# Patient Record
Sex: FEMALE | Race: WHITE | Employment: UNEMPLOYED | URBAN - METROPOLITAN AREA
[De-identification: names, ages, dates, MRNs, and addresses within clinical notes are randomized per-mention and may not be internally consistent; named-entity substitution may affect disease eponyms.]

---

## 2019-04-16 ENCOUNTER — HOSPITAL ENCOUNTER (INPATIENT)
Age: 29
LOS: 2 days | Discharge: HOME | DRG: 424 | End: 2019-04-18
Attending: EMERGENCY MEDICINE | Admitting: INTERNAL MEDICINE
Payer: COMMERCIAL

## 2019-04-16 ENCOUNTER — APPOINTMENT (OUTPATIENT)
Dept: GENERAL RADIOLOGY | Age: 29
DRG: 424 | End: 2019-04-16
Attending: INTERNAL MEDICINE
Payer: COMMERCIAL

## 2019-04-16 ENCOUNTER — APPOINTMENT (OUTPATIENT)
Dept: ULTRASOUND IMAGING | Age: 29
DRG: 424 | End: 2019-04-16
Attending: INTERNAL MEDICINE
Payer: COMMERCIAL

## 2019-04-16 ENCOUNTER — APPOINTMENT (OUTPATIENT)
Dept: CT IMAGING | Age: 29
DRG: 424 | End: 2019-04-16
Attending: EMERGENCY MEDICINE
Payer: COMMERCIAL

## 2019-04-16 DIAGNOSIS — F12.90 MARIJUANA USE: ICD-10-CM

## 2019-04-16 DIAGNOSIS — R10.9 FLANK PAIN: ICD-10-CM

## 2019-04-16 DIAGNOSIS — G43.A0 CYCLIC VOMITING SYNDROME, INTRACTABILITY OF VOMITING NOT SPECIFIED, PRESENCE OF NAUSEA NOT SPECIFIED: ICD-10-CM

## 2019-04-16 DIAGNOSIS — N20.0 KIDNEY STONE: Primary | ICD-10-CM

## 2019-04-16 PROBLEM — I95.9 HYPOTENSION: Status: ACTIVE | Noted: 2019-04-16

## 2019-04-16 PROBLEM — R11.10 INTRACTABLE VOMITING: Status: ACTIVE | Noted: 2019-04-16

## 2019-04-16 LAB
ALBUMIN SERPL-MCNC: 4.1 G/DL (ref 3.5–5)
ALBUMIN/GLOB SERPL: 1.1 {RATIO} (ref 1.1–2.2)
ALP SERPL-CCNC: 97 U/L (ref 45–117)
ALT SERPL-CCNC: 65 U/L (ref 12–78)
ANION GAP SERPL CALC-SCNC: 10 MMOL/L (ref 5–15)
APPEARANCE UR: ABNORMAL
AST SERPL-CCNC: 50 U/L (ref 15–37)
BACTERIA URNS QL MICRO: ABNORMAL /HPF
BASOPHILS # BLD: 0.1 K/UL (ref 0–0.1)
BASOPHILS NFR BLD: 1 % (ref 0–1)
BILIRUB SERPL-MCNC: 0.5 MG/DL (ref 0.2–1)
BILIRUB UR QL CFM: NEGATIVE
BUN SERPL-MCNC: 18 MG/DL (ref 6–20)
BUN/CREAT SERPL: 18 (ref 12–20)
CALCIUM SERPL-MCNC: 9.4 MG/DL (ref 8.5–10.1)
CHLORIDE SERPL-SCNC: 104 MMOL/L (ref 97–108)
CO2 SERPL-SCNC: 25 MMOL/L (ref 21–32)
COLOR UR: ABNORMAL
CREAT SERPL-MCNC: 1.02 MG/DL (ref 0.55–1.02)
DIFFERENTIAL METHOD BLD: ABNORMAL
EOSINOPHIL # BLD: 0.2 K/UL (ref 0–0.4)
EOSINOPHIL NFR BLD: 3 % (ref 0–7)
EPITH CASTS URNS QL MICRO: ABNORMAL /LPF
ERYTHROCYTE [DISTWIDTH] IN BLOOD BY AUTOMATED COUNT: 12.5 % (ref 11.5–14.5)
GLOBULIN SER CALC-MCNC: 3.8 G/DL (ref 2–4)
GLUCOSE SERPL-MCNC: 113 MG/DL (ref 65–100)
GLUCOSE UR STRIP.AUTO-MCNC: NEGATIVE MG/DL
HCG SERPL QL: NEGATIVE
HCT VFR BLD AUTO: 41.9 % (ref 35–47)
HGB BLD-MCNC: 14.6 G/DL (ref 11.5–16)
HGB UR QL STRIP: ABNORMAL
IMM GRANULOCYTES # BLD AUTO: 0 K/UL (ref 0–0.04)
IMM GRANULOCYTES NFR BLD AUTO: 0 % (ref 0–0.5)
KETONES UR QL STRIP.AUTO: 15 MG/DL
LACTATE SERPL-SCNC: 1.4 MMOL/L (ref 0.4–2)
LEUKOCYTE ESTERASE UR QL STRIP.AUTO: ABNORMAL
LYMPHOCYTES # BLD: 1.9 K/UL (ref 0.8–3.5)
LYMPHOCYTES NFR BLD: 42 % (ref 12–49)
MCH RBC QN AUTO: 29 PG (ref 26–34)
MCHC RBC AUTO-ENTMCNC: 34.8 G/DL (ref 30–36.5)
MCV RBC AUTO: 83.3 FL (ref 80–99)
MONOCYTES # BLD: 0.7 K/UL (ref 0–1)
MONOCYTES NFR BLD: 15 % (ref 5–13)
NEUTS SEG # BLD: 1.9 K/UL (ref 1.8–8)
NEUTS SEG NFR BLD: 39 % (ref 32–75)
NITRITE UR QL STRIP.AUTO: NEGATIVE
NRBC # BLD: 0 K/UL (ref 0–0.01)
NRBC BLD-RTO: 0 PER 100 WBC
PH UR STRIP: 5.5 [PH] (ref 5–8)
PLATELET # BLD AUTO: 177 K/UL (ref 150–400)
PMV BLD AUTO: 10.5 FL (ref 8.9–12.9)
POTASSIUM SERPL-SCNC: 3.4 MMOL/L (ref 3.5–5.1)
PROT SERPL-MCNC: 7.9 G/DL (ref 6.4–8.2)
PROT UR STRIP-MCNC: ABNORMAL MG/DL
RBC # BLD AUTO: 5.03 M/UL (ref 3.8–5.2)
RBC #/AREA URNS HPF: ABNORMAL /HPF (ref 0–5)
SODIUM SERPL-SCNC: 139 MMOL/L (ref 136–145)
SP GR UR REFRACTOMETRY: 1.03 (ref 1–1.03)
T4 FREE SERPL-MCNC: 1.6 NG/DL (ref 0.8–1.5)
TSH SERPL DL<=0.05 MIU/L-ACNC: 36.6 UIU/ML (ref 0.36–3.74)
UA: UC IF INDICATED,UAUC: ABNORMAL
UROBILINOGEN UR QL STRIP.AUTO: 1 EU/DL (ref 0.2–1)
WBC # BLD AUTO: 4.7 K/UL (ref 3.6–11)
WBC URNS QL MICRO: ABNORMAL /HPF (ref 0–4)

## 2019-04-16 PROCEDURE — 85025 COMPLETE CBC W/AUTO DIFF WBC: CPT

## 2019-04-16 PROCEDURE — 93005 ELECTROCARDIOGRAM TRACING: CPT

## 2019-04-16 PROCEDURE — 83605 ASSAY OF LACTIC ACID: CPT

## 2019-04-16 PROCEDURE — 36415 COLL VENOUS BLD VENIPUNCTURE: CPT

## 2019-04-16 PROCEDURE — 96361 HYDRATE IV INFUSION ADD-ON: CPT

## 2019-04-16 PROCEDURE — 74011250636 HC RX REV CODE- 250/636: Performed by: INTERNAL MEDICINE

## 2019-04-16 PROCEDURE — 84481 FREE ASSAY (FT-3): CPT

## 2019-04-16 PROCEDURE — 80053 COMPREHEN METABOLIC PANEL: CPT

## 2019-04-16 PROCEDURE — 84443 ASSAY THYROID STIM HORMONE: CPT

## 2019-04-16 PROCEDURE — 99218 HC RM OBSERVATION: CPT

## 2019-04-16 PROCEDURE — 84703 CHORIONIC GONADOTROPIN ASSAY: CPT

## 2019-04-16 PROCEDURE — 96375 TX/PRO/DX INJ NEW DRUG ADDON: CPT

## 2019-04-16 PROCEDURE — 87086 URINE CULTURE/COLONY COUNT: CPT

## 2019-04-16 PROCEDURE — 74011250637 HC RX REV CODE- 250/637: Performed by: EMERGENCY MEDICINE

## 2019-04-16 PROCEDURE — 71045 X-RAY EXAM CHEST 1 VIEW: CPT

## 2019-04-16 PROCEDURE — 65660000000 HC RM CCU STEPDOWN

## 2019-04-16 PROCEDURE — 96374 THER/PROPH/DIAG INJ IV PUSH: CPT

## 2019-04-16 PROCEDURE — 86376 MICROSOMAL ANTIBODY EACH: CPT

## 2019-04-16 PROCEDURE — 84439 ASSAY OF FREE THYROXINE: CPT

## 2019-04-16 PROCEDURE — 99284 EMERGENCY DEPT VISIT MOD MDM: CPT

## 2019-04-16 PROCEDURE — 74176 CT ABD & PELVIS W/O CONTRAST: CPT

## 2019-04-16 PROCEDURE — 74011000250 HC RX REV CODE- 250: Performed by: INTERNAL MEDICINE

## 2019-04-16 PROCEDURE — 74011250636 HC RX REV CODE- 250/636: Performed by: EMERGENCY MEDICINE

## 2019-04-16 PROCEDURE — 81001 URINALYSIS AUTO W/SCOPE: CPT

## 2019-04-16 PROCEDURE — 76705 ECHO EXAM OF ABDOMEN: CPT

## 2019-04-16 RX ORDER — LEVOFLOXACIN 5 MG/ML
750 INJECTION, SOLUTION INTRAVENOUS EVERY 24 HOURS
Status: DISCONTINUED | OUTPATIENT
Start: 2019-04-16 | End: 2019-04-18 | Stop reason: HOSPADM

## 2019-04-16 RX ORDER — MORPHINE SULFATE 2 MG/ML
4 INJECTION, SOLUTION INTRAMUSCULAR; INTRAVENOUS
Status: COMPLETED | OUTPATIENT
Start: 2019-04-16 | End: 2019-04-16

## 2019-04-16 RX ORDER — TAMSULOSIN HYDROCHLORIDE 0.4 MG/1
0.4 CAPSULE ORAL
Status: COMPLETED | OUTPATIENT
Start: 2019-04-16 | End: 2019-04-16

## 2019-04-16 RX ORDER — SODIUM CHLORIDE 0.9 % (FLUSH) 0.9 %
5-40 SYRINGE (ML) INJECTION EVERY 8 HOURS
Status: DISCONTINUED | OUTPATIENT
Start: 2019-04-16 | End: 2019-04-18 | Stop reason: HOSPADM

## 2019-04-16 RX ORDER — KETOROLAC TROMETHAMINE 30 MG/ML
30 INJECTION, SOLUTION INTRAMUSCULAR; INTRAVENOUS
Status: DISCONTINUED | OUTPATIENT
Start: 2019-04-16 | End: 2019-04-18 | Stop reason: HOSPADM

## 2019-04-16 RX ORDER — CEPHALEXIN 250 MG/1
500 CAPSULE ORAL
Status: COMPLETED | OUTPATIENT
Start: 2019-04-16 | End: 2019-04-16

## 2019-04-16 RX ORDER — ONDANSETRON 4 MG/1
4 TABLET, ORALLY DISINTEGRATING ORAL
Qty: 10 TAB | Refills: 0 | Status: SHIPPED | OUTPATIENT
Start: 2019-04-16 | End: 2019-04-18

## 2019-04-16 RX ORDER — ONDANSETRON 2 MG/ML
4 INJECTION INTRAMUSCULAR; INTRAVENOUS
Status: DISCONTINUED | OUTPATIENT
Start: 2019-04-16 | End: 2019-04-18 | Stop reason: HOSPADM

## 2019-04-16 RX ORDER — ONDANSETRON 2 MG/ML
4 INJECTION INTRAMUSCULAR; INTRAVENOUS
Status: COMPLETED | OUTPATIENT
Start: 2019-04-16 | End: 2019-04-16

## 2019-04-16 RX ORDER — CETIRIZINE HCL 10 MG
10 TABLET ORAL
COMMUNITY

## 2019-04-16 RX ORDER — HYDROMORPHONE HYDROCHLORIDE 1 MG/ML
1 INJECTION, SOLUTION INTRAMUSCULAR; INTRAVENOUS; SUBCUTANEOUS
Status: DISCONTINUED | OUTPATIENT
Start: 2019-04-16 | End: 2019-04-16

## 2019-04-16 RX ORDER — ACETAMINOPHEN 325 MG/1
650 TABLET ORAL
Status: DISCONTINUED | OUTPATIENT
Start: 2019-04-16 | End: 2019-04-18 | Stop reason: HOSPADM

## 2019-04-16 RX ORDER — PROCHLORPERAZINE EDISYLATE 5 MG/ML
10 INJECTION INTRAMUSCULAR; INTRAVENOUS
Status: COMPLETED | OUTPATIENT
Start: 2019-04-16 | End: 2019-04-16

## 2019-04-16 RX ORDER — KETOROLAC TROMETHAMINE 30 MG/ML
15 INJECTION, SOLUTION INTRAMUSCULAR; INTRAVENOUS
Status: COMPLETED | OUTPATIENT
Start: 2019-04-16 | End: 2019-04-16

## 2019-04-16 RX ORDER — LEVOTHYROXINE SODIUM 75 UG/1
75 TABLET ORAL
Status: DISCONTINUED | OUTPATIENT
Start: 2019-04-17 | End: 2019-04-16

## 2019-04-16 RX ORDER — PROCHLORPERAZINE EDISYLATE 5 MG/ML
10 INJECTION INTRAMUSCULAR; INTRAVENOUS
Status: DISCONTINUED | OUTPATIENT
Start: 2019-04-16 | End: 2019-04-16

## 2019-04-16 RX ORDER — ONDANSETRON 4 MG/1
4 TABLET, ORALLY DISINTEGRATING ORAL
Status: ON HOLD | COMMUNITY
End: 2019-04-18

## 2019-04-16 RX ORDER — SODIUM CHLORIDE 0.9 % (FLUSH) 0.9 %
5-40 SYRINGE (ML) INJECTION AS NEEDED
Status: DISCONTINUED | OUTPATIENT
Start: 2019-04-16 | End: 2019-04-18 | Stop reason: HOSPADM

## 2019-04-16 RX ORDER — SODIUM CHLORIDE, SODIUM LACTATE, POTASSIUM CHLORIDE, CALCIUM CHLORIDE 600; 310; 30; 20 MG/100ML; MG/100ML; MG/100ML; MG/100ML
125 INJECTION, SOLUTION INTRAVENOUS CONTINUOUS
Status: DISCONTINUED | OUTPATIENT
Start: 2019-04-16 | End: 2019-04-18 | Stop reason: HOSPADM

## 2019-04-16 RX ADMIN — SODIUM CHLORIDE 1000 ML: 900 INJECTION, SOLUTION INTRAVENOUS at 07:37

## 2019-04-16 RX ADMIN — ONDANSETRON 4 MG: 2 INJECTION INTRAMUSCULAR; INTRAVENOUS at 07:37

## 2019-04-16 RX ADMIN — Medication 10 ML: at 20:09

## 2019-04-16 RX ADMIN — MORPHINE SULFATE 4 MG: 2 INJECTION, SOLUTION INTRAMUSCULAR; INTRAVENOUS at 07:37

## 2019-04-16 RX ADMIN — TAMSULOSIN HYDROCHLORIDE 0.4 MG: 0.4 CAPSULE ORAL at 09:57

## 2019-04-16 RX ADMIN — LEVOFLOXACIN 750 MG: 5 INJECTION, SOLUTION INTRAVENOUS at 22:56

## 2019-04-16 RX ADMIN — SODIUM CHLORIDE 1000 ML: 900 INJECTION, SOLUTION INTRAVENOUS at 12:09

## 2019-04-16 RX ADMIN — CEPHALEXIN 500 MG: 250 CAPSULE ORAL at 11:41

## 2019-04-16 RX ADMIN — SODIUM CHLORIDE 1000 ML: 900 INJECTION, SOLUTION INTRAVENOUS at 16:25

## 2019-04-16 RX ADMIN — PROCHLORPERAZINE EDISYLATE 10 MG: 5 INJECTION INTRAMUSCULAR; INTRAVENOUS at 12:13

## 2019-04-16 RX ADMIN — LEVOTHYROXINE SODIUM ANHYDROUS 200 MCG: 100 INJECTION, POWDER, LYOPHILIZED, FOR SOLUTION INTRAVENOUS at 20:08

## 2019-04-16 RX ADMIN — KETOROLAC TROMETHAMINE 15 MG: 30 INJECTION, SOLUTION INTRAMUSCULAR; INTRAVENOUS at 07:37

## 2019-04-16 RX ADMIN — SODIUM CHLORIDE, SODIUM LACTATE, POTASSIUM CHLORIDE, AND CALCIUM CHLORIDE 125 ML/HR: 600; 310; 30; 20 INJECTION, SOLUTION INTRAVENOUS at 20:13

## 2019-04-16 NOTE — ED TRIAGE NOTES
Pt. Presents to ED with L flank pain that woke her up this AM.  Patient reports having kidney stones in the past. Pt. Alert and oriented x4. PT. Placed in position of comfort with call bell in reach.

## 2019-04-16 NOTE — ED NOTES
Pt. Back from CT at this time. Pt. Placed back on monitor with call bell in reach. Pt. With no complaints and does not want pain medication at this time.

## 2019-04-16 NOTE — ED NOTES
Writer called into room at this time. Patient vomiting and stating the pain is back. Spoke with Dr. Danilo Clemente who is to place orders and come see patient.

## 2019-04-16 NOTE — H&P
Hospitalist Admission NoteNAME: [facility] :  1990 MRN:  [id number] Date/Time:  2019 4:55 PM 
 
Patient PCP: None 
______________________________________________________________________ Given the patient's current clinical presentation, I have a high level of concern for decompensation if discharged from the emergency department. Complex decision making was performed, which includes reviewing the patient's available past medical records, laboratory results, and x-ray films. My assessment of this patient's clinical condition and my plan of care is as follows. Assessment / Plan: 
Addendum 2: 
Free T4 mildly elevated at 1.6 (1.5 is upper limit of normal), patient still hypotensive, will change to Inpatient. I have also placed Endocrine consult and case discussed with on-call Endocrinologist shireen (Dr. Miguel Tyson). Will send T3 (note patient already received IV Levothyroxine) and Thyroid antibody panel. Will check Lactic acid as well. TSH producing Adenoma in differential diagnosis. Will start Levaquin as well (UA not convincing for UTI, may epi's and no pyuria). ____________________ TSH 36 - Patient with Hypothyroidism, with patient's low HR and BP will given 200 mcg IV Levothyroxine once as a loading dose and start 75 mcg daily tomorrow am 
____________________ Left kidney stone with vomiting and dehydration 
-admit to inpatient, telemetry 
-IVF's 
-with below issues will not start flomax at this time 
-IV toradol prn, avoid opiates because of hypotension 
-IV antiemetics (zofran) prn; avoid compazine (see below) Hypotension and Bradycardia: ?Iatrogenic (did IV morphine initially drop BP and by the time patient recovered the IV Compazine dropped it again, Flomax can also cause hypotension), Takotsubo's cardiomyopathy (with passing of friend), Endocrine issue (hitory of untreated hypothyroidism) 07:37 4 mg IV morphine/Zofran 
07:29 /97 
09:57 Flomax 10:00 BP 82/46 
12:08 /64 
12:13 BP 10 mg IV Compazine 13:00 82/47 
-Continue IVF's 
-bedrest until SBP greater than 90 mmHg 
-check TFT's 
-check am cortisol 
-TTE 
-EKG with Sinus Bradycardia Mild Hypokalemia: 
-monitor (patient on LR) Incidental findings on CT A/P: \". ..2. Concern for a hypodense lesion in the dome of the liver. Prominent gastrohepatic and retroperitoneal lymph nodes of uncertain etiology and clinical significance. Correlation with nonemergent contrast-enhanced CT is recommended. \" 
-LFT's wnl, will order abdominal ultrasound to further evaluate Also discussed with patient the Incidental Lymphadenopathy noted on CT - ?related to Hypothyroidism as well as patient's 2.6 cm right ovarian cyst  
 
Code Status: Full Surrogate Decision Maker:  DVT Prophylaxis:  
GI Prophylaxis: not indicated Baseline: Normal  
  
Subjective: CHIEF COMPLAINT: left flank pain HISTORY OF PRESENT ILLNESS:    
Trena Felty is a 25 y.o.  female who presented with left flank pain this am that awoke her this am. Patient presented here with severe pain and felt that it was a kidney stone. Patient's CT A/P showed: \"1. Punctate stone left UVJ causing mild left hydronephrosis. Bilateral nonobstructing renal stones. 2. Concern for a hypodense lesion in the dome of the liver. Prominent gastrohepatic and retroperitoneal lymph nodes of uncertain etiology and clinical significance. Correlation with nonemergent contrast-enhanced CT is recommended. 3. Periportal and pericholecystic edema may be related to fluid administration. \" Patient denies any fevers or chills. She does get lightheaded with sitting. Patient also hypotensive at time of my evaluation. Patient is unsure about her baseline BP. Patient reports having the flu last month and has not recoererd since. Patient reports that towards the end of her flu illness she had 3 days of severe chest heaviness.  Patient reports continue REYNOSO even with house work since this time. Patient denies and LE swelling. We were asked to admit for work up and evaluation of the above problems. PMH: 
History of flu in March 2019 History of kidney stone Premature at birth History of Heart Murmur ? Hypothyroidism, states that she is not on treatment because she told that treatment was optional to start but after she started she would have to stay on 
 
Social History Tobacco Use  Smoking status: Occasional tobacco and marijuana Substance Use Topics  Alcohol use: Social  
  
Family History: denies family history of CAD, CHF, Autoimmune Disease; Father with Alcoholism. Allergies: Chocolate makes the inside of patient's mouth peel Prior to Admission medications Medication Sig Start Date End Date Taking? Authorizing Provider  
ondansetron (ZOFRAN ODT) 4 mg disintegrating tablet Take 1 Tab by mouth every eight (8) hours as needed for Nausea. 4/16/19  Yes Santhosh Bird MD  
cetirizine (ZYRTEC) 10 mg tablet Take 10 mg by mouth daily as needed for Allergies. Yes Provider, Historical  
ondansetron (ZOFRAN ODT) 4 mg disintegrating tablet Take 4 mg by mouth every eight (8) hours as needed for Nausea. Provider, Historical  
 
 
REVIEW OF SYSTEMS:    
I am not able to complete the review of systems because: The patient is intubated and sedated The patient has altered mental status due to his acute medical problems The patient has baseline aphasia from prior stroke(s) The patient has baseline dementia and is not reliable historian The patient is in acute medical distress and unable to provide information Total of 12 systems reviewed as follows:   
   POSITIVE= underlined text  Negative = text not underlined General:  fever, chill, sweats, generalized weakness, weight loss/gain,  
   loss of appetite Eyes:    blurred vision, eye pain, loss of vision, double vision ENT:    rhinorrhea, pharyngitis Respiratory:   cough, sputum production (mild, coughed hard this am and had blood streaked sputum x1), SOB, REYNOSO since having the flu, wheezing, pleuritic pain  
Cardiology:   chest pain, palpitations, orthopnea, PND, edema, syncope Gastrointestinal:  abdominal pain , N/V, diarrhea, dysphagia, constipation, bleeding Genitourinary:  frequency, urgency, dysuria, hematuria, incontinence Muskuloskeletal :  arthralgia, myalgia, back pain Hematology:  easy bruising, nose or gum bleeding, lymphadenopathy Dermatological: rash, ulceration, pruritis, color change / jaundice Endocrine:   hot flashes or polydipsia Neurological:  headache, dizziness, confusion, focal weakness, paresthesia, Speech difficulties, memory loss, gait difficulty Psychological: Feelings of anxiety, depression, agitation; grief after friend passed away this last weekend Objective: VITALS:   
Visit Vitals BP (!) 83/48 Pulse (!) 46 Resp 12 Ht 5' 2\" (1.575 m) Wt 47.6 kg (105 lb) SpO2 100% BMI 19.20 kg/m² PHYSICAL EXAM: 
 
General:    Alert, cooperative, no distress, appears stated age. HEENT: Atraumatic, anicteric sclerae, pink conjunctivae No oral ulcers, mucosa dry, o/p clear Neck:  Supple, symmetrical,  Thyroid not enlarged Lungs:   Clear to auscultation bilaterally. No Wheezing or Rhonchi. No rales. Chest wall:  No tenderness  No Accessory muscle use. Heart:   Regular  rhythm,  Bradycardic,  No edema Abdomen:   Soft, non-tender. Not distended. Bowel sounds normal 
Extremities: No cyanosis. No clubbing,   
  Skin turgor normal, Capillary refill normal, Radial dial pulse 2+ Skin:     Not pale. Not Jaundiced  No rashes Psych:  Good insight. Not depressed. Not anxious or agitated. Neurologic: EOMs intact. No facial asymmetry. No aphasia or slurred speech. Symmetrical strength, No focal neurologic deficits. Alert and oriented X 4. _______________________________________________________________________ Care Plan discussed with: 
  Comments Patient x Family  x   
RN x Care Manager Consultant:     
_______________________________________________________________________ Expected  Disposition:  
Home with Family HH/PT/OT/RN   
SNF/LTC   
ROSANNA   
________________________________________________________________________ TOTAL TIME:  65 Minutes Critical Care Provided     Minutes non procedure based Comments  
 x Reviewed previous records  
>50% of visit spent in counseling and coordination of care x Discussion with patient and/or family and questions answered 
  
 
________________________________________________________________________ Signed: Kacie Mcmullen MD 
 
Procedures: see electronic medical records for all procedures/Xrays and details which were not copied into this note but were reviewed prior to creation of Plan. LAB DATA REVIEWED:   
Recent Results (from the past 24 hour(s)) CBC WITH AUTOMATED DIFF Collection Time: 04/16/19  7:37 AM  
Result Value Ref Range WBC 4.7 3.6 - 11.0 K/uL  
 RBC 5.03 3.80 - 5.20 M/uL  
 HGB 14.6 11.5 - 16.0 g/dL HCT 41.9 35.0 - 47.0 % MCV 83.3 80.0 - 99.0 FL  
 MCH 29.0 26.0 - 34.0 PG  
 MCHC 34.8 30.0 - 36.5 g/dL  
 RDW 12.5 11.5 - 14.5 % PLATELET 146 978 - 886 K/uL MPV 10.5 8.9 - 12.9 FL  
 NRBC 0.0 0  WBC ABSOLUTE NRBC 0.00 0.00 - 0.01 K/uL NEUTROPHILS 39 32 - 75 % LYMPHOCYTES 42 12 - 49 % MONOCYTES 15 (H) 5 - 13 % EOSINOPHILS 3 0 - 7 % BASOPHILS 1 0 - 1 % IMMATURE GRANULOCYTES 0 0.0 - 0.5 % ABS. NEUTROPHILS 1.9 1.8 - 8.0 K/UL  
 ABS. LYMPHOCYTES 1.9 0.8 - 3.5 K/UL  
 ABS. MONOCYTES 0.7 0.0 - 1.0 K/UL  
 ABS. EOSINOPHILS 0.2 0.0 - 0.4 K/UL  
 ABS. BASOPHILS 0.1 0.0 - 0.1 K/UL  
 ABS. IMM. GRANS. 0.0 0.00 - 0.04 K/UL  
 DF AUTOMATED METABOLIC PANEL, COMPREHENSIVE  Collection Time: 04/16/19  7:37 AM  
 Result Value Ref Range Sodium 139 136 - 145 mmol/L Potassium 3.4 (L) 3.5 - 5.1 mmol/L Chloride 104 97 - 108 mmol/L  
 CO2 25 21 - 32 mmol/L Anion gap 10 5 - 15 mmol/L Glucose 113 (H) 65 - 100 mg/dL BUN 18 6 - 20 MG/DL Creatinine 1.02 0.55 - 1.02 MG/DL  
 BUN/Creatinine ratio 18 12 - 20 GFR est AA >60 >60 ml/min/1.73m2 GFR est non-AA >60 >60 ml/min/1.73m2 Calcium 9.4 8.5 - 10.1 MG/DL Bilirubin, total 0.5 0.2 - 1.0 MG/DL  
 ALT (SGPT) 65 12 - 78 U/L  
 AST (SGOT) 50 (H) 15 - 37 U/L Alk. phosphatase 97 45 - 117 U/L Protein, total 7.9 6.4 - 8.2 g/dL Albumin 4.1 3.5 - 5.0 g/dL Globulin 3.8 2.0 - 4.0 g/dL A-G Ratio 1.1 1.1 - 2.2 HCG QL SERUM Collection Time: 04/16/19  7:37 AM  
Result Value Ref Range HCG, Ql. NEGATIVE  NEG    
URINALYSIS W/ REFLEX CULTURE Collection Time: 04/16/19  9:58 AM  
Result Value Ref Range Color DARK YELLOW Appearance CLOUDY (A) CLEAR Specific gravity 1.027 1.003 - 1.030    
 pH (UA) 5.5 5.0 - 8.0 Protein TRACE (A) NEG mg/dL Glucose NEGATIVE  NEG mg/dL Ketone 15 (A) NEG mg/dL Blood TRACE (A) NEG Urobilinogen 1.0 0.2 - 1.0 EU/dL Nitrites NEGATIVE  NEG Leukocyte Esterase TRACE (A) NEG    
 WBC 5-10 0 - 4 /hpf  
 RBC 5-10 0 - 5 /hpf Epithelial cells MANY (A) FEW /lpf Bacteria 1+ (A) NEG /hpf  
 UA:UC IF INDICATED URINE CULTURE ORDERED (A) CNI    
BILIRUBIN, CONFIRM Collection Time: 04/16/19  9:58 AM  
Result Value Ref Range Bilirubin UA, confirm NEGATIVE  NEG    
EKG, 12 LEAD, INITIAL Collection Time: 04/16/19  5:13 PM  
Result Value Ref Range Ventricular Rate 35 BPM  
 Atrial Rate 35 BPM  
 P-R Interval 148 ms QRS Duration 82 ms Q-T Interval 556 ms  
 QTC Calculation (Bezet) 424 ms Calculated P Axis 75 degrees Calculated R Axis 80 degrees Calculated T Axis 68 degrees Diagnosis Marked sinus bradycardia No previous ECGs available

## 2019-04-16 NOTE — ED PROVIDER NOTES
EMERGENCY DEPARTMENT HISTORY AND PHYSICAL EXAM 
     
 
Date: 4/16/2019 Patient Name: Gabe Ritchie History of Presenting Illness Chief Complaint Patient presents with  Flank Pain  
  left side since approx 0600 History Provided By: Patient HPI: Gabe Ritchie is a 25 y.o. female, pmhx kidney stones, who presents by car to the ED c/o left back./flank pain that began about 2-3 hours ago waking her from sleep. She states it feels like a stone. Pain wraps around toward front. She has nausea and dry heaves but not vomiting. She otherwise denies any recent fevers, chills,  vomiting, diarrhea CP, SOB, urinary sxs, changes in BM, or headache. She denies chance of pregnancy. PCP: None Social Hx: -tobacco (-), -EtOH (-), -Illicit Drugs (-) There are no other complaints, changes, or physical findings at this time. Current Facility-Administered Medications Medication Dose Route Frequency Provider Last Rate Last Dose  sodium chloride 0.9 % bolus infusion 1,000 mL  1,000 mL IntraVENous Fara Marr MD 1,000 mL/hr at 04/16/19 0737 1,000 mL at 04/16/19 0737 Past History Past Medical History: 
History reviewed. No pertinent past medical history. Past Surgical History: 
History reviewed. No pertinent surgical history. Family History: 
History reviewed. No pertinent family history. Social History: 
Social History Tobacco Use  Smoking status: Not on file Substance Use Topics  Alcohol use: Not on file  Drug use: Not on file Allergies: 
No Known Allergies Review of Systems Review of Systems Constitutional: Negative for activity change, appetite change, fatigue and fever. HENT: Negative. Negative for congestion, rhinorrhea and sore throat. Respiratory: Negative. Negative for cough, shortness of breath and wheezing. Cardiovascular: Negative. Negative for chest pain and leg swelling. Gastrointestinal: Positive for nausea. Negative for abdominal distention, abdominal pain, constipation, diarrhea and vomiting. Endocrine: Negative. Genitourinary: Positive for flank pain. Negative for difficulty urinating, dysuria, menstrual problem, vaginal bleeding and vaginal discharge. Musculoskeletal: Negative for arthralgias, joint swelling and myalgias. Skin: Negative. Negative for rash. Neurological: Negative. Negative for dizziness, weakness, light-headedness and headaches. Psychiatric/Behavioral: Negative. Physical Exam  
Physical Exam  
Constitutional: She is oriented to person, place, and time. She appears well-nourished. She appears distressed. Due to pain HENT:  
Head: Atraumatic. Eyes: Pupils are equal, round, and reactive to light. Conjunctivae are normal.  
Neck: Normal range of motion. Cardiovascular: Normal rate, regular rhythm, normal heart sounds and intact distal pulses. No murmur heard. Pulmonary/Chest: Effort normal and breath sounds normal. No respiratory distress. She has no wheezes. She has no rales. She exhibits no tenderness. Abdominal: Soft. Bowel sounds are normal. She exhibits no distension and no mass. There is no tenderness. There is no rebound and no guarding. Musculoskeletal: Normal range of motion. Neurological: She is alert and oriented to person, place, and time. No cranial nerve deficit. Skin: Skin is warm. No rash noted. No erythema. Nursing note and vitals reviewed. Diagnostic Study Results Labs - Recent Results (from the past 12 hour(s)) CBC WITH AUTOMATED DIFF Collection Time: 04/16/19  7:37 AM  
Result Value Ref Range WBC 4.7 3.6 - 11.0 K/uL  
 RBC 5.03 3.80 - 5.20 M/uL  
 HGB 14.6 11.5 - 16.0 g/dL HCT 41.9 35.0 - 47.0 % MCV 83.3 80.0 - 99.0 FL  
 MCH 29.0 26.0 - 34.0 PG  
 MCHC 34.8 30.0 - 36.5 g/dL  
 RDW 12.5 11.5 - 14.5 % PLATELET 631 240 - 831 K/uL  MPV 10.5 8.9 - 12.9 FL  
 NRBC 0.0 0  WBC ABSOLUTE NRBC 0.00 0.00 - 0.01 K/uL NEUTROPHILS 39 32 - 75 % LYMPHOCYTES 42 12 - 49 % MONOCYTES 15 (H) 5 - 13 % EOSINOPHILS 3 0 - 7 % BASOPHILS 1 0 - 1 % IMMATURE GRANULOCYTES 0 0.0 - 0.5 % ABS. NEUTROPHILS 1.9 1.8 - 8.0 K/UL  
 ABS. LYMPHOCYTES 1.9 0.8 - 3.5 K/UL  
 ABS. MONOCYTES 0.7 0.0 - 1.0 K/UL  
 ABS. EOSINOPHILS 0.2 0.0 - 0.4 K/UL  
 ABS. BASOPHILS 0.1 0.0 - 0.1 K/UL  
 ABS. IMM. GRANS. 0.0 0.00 - 0.04 K/UL  
 DF AUTOMATED Radiologic Studies - No orders to display CT Results  (Last 48 hours) None CXR Results  (Last 48 hours) None Medical Decision Making I am the first provider for this patient. I reviewed the vital signs, available nursing notes, past medical history, past surgical history, family history and social history. Vital Signs-Reviewed the patient's vital signs. Patient Vitals for the past 12 hrs: 
 Pulse Resp BP SpO2  
04/16/19 0729 73 28 (!) 154/97 100 % Records Reviewed: Nursing Notes and Old Medical Records Provider Notes (Medical Decision Making): DDx: most likely ureteral stone, kidney stone, UTI, pyelonephritis, pregnancy (although pt denied any chance). Will obtain labs, UA, and CT scan while providing supportive care/pain relief. ED Course:  
Initial assessment performed. The patients presenting problems have been discussed, and they are in agreement with the care plan formulated and outlined with them. I have encouraged them to ask questions as they arise throughout their visit. PROGRESS NOTE: 
8:20 AM 
Pt reevaluated. Pt minimal relief yet. Will reorder pain medication Progress note: 
9:30AM 
Pt was reassessed and resting comfortably this time. Second dose pain medication was not given as patient was resting per nursing. Will hold for now. Pt was updated on CT. Written by Jade Howell MD  
 
Progress note: Went to discharge patient for third time and she vomiting again when got up. I asked her about marijuana use given refractory nature of her symptoms. She endorsed she has been using more due to a recent death. Had long conversation on how this might be contributing to her symptoms. Decision was made together to be admitted to hospital. 
 
 
CONSULT NOTE:  
4:41 PM 
Dr Anna Rebollar spoke with Dr Pancho Boswell Specialty: Hospitalist 
Discussed pt's hx, disposition, and available diagnostic and imaging results. Reviewed care plans. Consultant will evaluate pt for admission. Written by Lita Panda MD 
 
 
 
Critical Care Time:  
None Diagnosis Clinical Impression: 1. Kidney stone 2. Flank pain 3. Marijuana use 4. Cyclic vomiting syndrome, intractability of vomiting not specified, presence of nausea not specified Disposition: 
 
ADMIT This note will not be viewable in [address]

## 2019-04-16 NOTE — PROGRESS NOTES
Pharmacy Clarification of the Prior to Admission Medication Regimen Retrospective to the Admission Medication Reconciliation The patient was interviewed regarding clarification of the prior to admission medication regimen. Daughters were present in room and obtained permission from patient to discuss drug regimen with visitor(s) present. Patient was questioned regarding use of any other inhalers, topical products, over the counter medications, herbal medications, vitamin products or ophthalmic/nasal/otic medication use. Information Obtained From: patient, prescription bottles Recommendations/Findings: The following amendments were made to the patient's active medication list on file at [facility]:  
 
1) Additions: Both medications below 2) Removals: NONE 3) Changes: NONE 4) Pertinent Pharmacy Findings: 
Updated patient?s preferred outpatient pharmacy to: [address] [place] [address] [address] [facility]  
ondansetron (ZOFRAN ODT) 4 mg disintegrating tablet: Patient was prescribed this agent today (4/16/16), and has not taken any prior to [facility] admission on 4/16/19 PTA medication list was corrected to the following:  
 
Prior to Admission Medications Prescriptions Last Dose Informant Patient Reported? Taking? cetirizine (ZYRTEC) 10 mg tablet 4/13/2019 at Unknown time Other Yes Yes Sig: Take 10 mg by mouth daily as needed for Allergies. ondansetron (ZOFRAN ODT) 4 mg disintegrating tablet Not Taking at Unknown time Other Yes No  
Sig: Take 4 mg by mouth every eight (8) hours as needed for Nausea. Facility-Administered Medications: None Thank you, 
Arian Lund CPhT Medication History Pharmacy Technician

## 2019-04-17 ENCOUNTER — APPOINTMENT (OUTPATIENT)
Dept: NON INVASIVE DIAGNOSTICS | Age: 29
DRG: 424 | End: 2019-04-17
Attending: INTERNAL MEDICINE
Payer: COMMERCIAL

## 2019-04-17 DIAGNOSIS — E03.8 HYPOTHYROIDISM DUE TO HASHIMOTO'S THYROIDITIS: Primary | ICD-10-CM

## 2019-04-17 DIAGNOSIS — E06.3 HYPOTHYROIDISM DUE TO HASHIMOTO'S THYROIDITIS: Primary | ICD-10-CM

## 2019-04-17 PROBLEM — R10.9 FLANK PAIN: Status: ACTIVE | Noted: 2019-04-17

## 2019-04-17 PROBLEM — R00.1 BRADYCARDIA: Status: ACTIVE | Noted: 2019-04-17

## 2019-04-17 LAB
ALBUMIN SERPL-MCNC: 2.8 G/DL (ref 3.5–5)
ALBUMIN/GLOB SERPL: 1 {RATIO} (ref 1.1–2.2)
ALP SERPL-CCNC: 74 U/L (ref 45–117)
ALT SERPL-CCNC: 47 U/L (ref 12–78)
ANION GAP SERPL CALC-SCNC: 6 MMOL/L (ref 5–15)
AST SERPL-CCNC: 39 U/L (ref 15–37)
ATRIAL RATE: 35 BPM
BACTERIA SPEC CULT: NORMAL
BASOPHILS # BLD: 0 K/UL (ref 0–0.1)
BASOPHILS NFR BLD: 1 % (ref 0–1)
BILIRUB SERPL-MCNC: 0.6 MG/DL (ref 0.2–1)
BUN SERPL-MCNC: 13 MG/DL (ref 6–20)
BUN/CREAT SERPL: 20 (ref 12–20)
CALCIUM SERPL-MCNC: 8 MG/DL (ref 8.5–10.1)
CALCULATED P AXIS, ECG09: 75 DEGREES
CALCULATED R AXIS, ECG10: 80 DEGREES
CALCULATED T AXIS, ECG11: 68 DEGREES
CC UR VC: NORMAL
CHLORIDE SERPL-SCNC: 111 MMOL/L (ref 97–108)
CO2 SERPL-SCNC: 23 MMOL/L (ref 21–32)
CORTIS 1H P CHAL SERPL-MCNC: 31.5 UG/DL
CORTIS 30M P CHAL SERPL-MCNC: 26.2 UG/DL
CORTIS AM PEAK SERPL-MCNC: 7.8 UG/DL (ref 4.3–22.45)
CORTIS BS SERPL-MCNC: 10.8 UG/DL
CREAT SERPL-MCNC: 0.66 MG/DL (ref 0.55–1.02)
DIAGNOSIS, 93000: NORMAL
DIFFERENTIAL METHOD BLD: ABNORMAL
ECHO AO ROOT DIAM: 2.62 CM
ECHO AV AREA PEAK VELOCITY: 3.1 CM2
ECHO AV AREA VTI: 3.5 CM2
ECHO AV AREA/BSA PEAK VELOCITY: 2.1 CM2/M2
ECHO AV AREA/BSA VTI: 2.4 CM2/M2
ECHO AV MEAN GRADIENT: 1.7 MMHG
ECHO AV PEAK GRADIENT: 3.5 MMHG
ECHO AV PEAK VELOCITY: 93.28 CM/S
ECHO AV VTI: 21.41 CM
ECHO EST RA PRESSURE: 10 MMHG
ECHO LV INTERNAL DIMENSION DIASTOLIC: 2.99 CM (ref 3.9–5.3)
ECHO LV INTERNAL DIMENSION SYSTOLIC: 2.05 CM
ECHO LV IVSD: 0.82 CM (ref 0.6–0.9)
ECHO LV MASS 2D: 67.5 G (ref 67–162)
ECHO LV MASS INDEX 2D: 46.4 G/M2 (ref 43–95)
ECHO LV POSTERIOR WALL DIASTOLIC: 0.91 CM (ref 0.6–0.9)
ECHO LVOT DIAM: 1.98 CM
ECHO LVOT PEAK GRADIENT: 3.4 MMHG
ECHO LVOT PEAK VELOCITY: 92.85 CM/S
ECHO LVOT SV: 74.6 ML
ECHO LVOT VTI: 24.34 CM
ECHO MV A VELOCITY: 40.6 CM/S
ECHO MV AREA PHT: 4.8 CM2
ECHO MV E DECELERATION TIME (DT): 141.6 MS
ECHO MV E VELOCITY: 120.69 CM/S
ECHO MV E/A RATIO: 2.97
ECHO MV PRESSURE HALF TIME (PHT): 46.1 MS
ECHO PULMONARY ARTERY SYSTOLIC PRESSURE (PASP): 28.4 MMHG
ECHO PV MAX VELOCITY: 77.63 CM/S
ECHO PV PEAK GRADIENT: 2.4 MMHG
ECHO PVEIN A DURATION: 147.9 MS
ECHO PVEIN A VELOCITY: 29.57 CM/S
ECHO PVEIN PEAK D VELOCITY: 56.09 CM/S
ECHO PVEIN PEAK S VELOCITY: 62.87 CM/S
ECHO PVEIN S/D RATIO: 1.1
ECHO RIGHT VENTRICULAR SYSTOLIC PRESSURE (RVSP): 28.4 MMHG
ECHO RV INTERNAL DIMENSION: 2.67 CM
ECHO TV REGURGITANT MAX VELOCITY: 214.31 CM/S
ECHO TV REGURGITANT PEAK GRADIENT: 18.4 MMHG
EOSINOPHIL # BLD: 0.2 K/UL (ref 0–0.4)
EOSINOPHIL NFR BLD: 3 % (ref 0–7)
ERYTHROCYTE [DISTWIDTH] IN BLOOD BY AUTOMATED COUNT: 12.6 % (ref 11.5–14.5)
GLOBULIN SER CALC-MCNC: 2.7 G/DL (ref 2–4)
GLUCOSE SERPL-MCNC: 91 MG/DL (ref 65–100)
HCT VFR BLD AUTO: 34.6 % (ref 35–47)
HGB BLD-MCNC: 11.8 G/DL (ref 11.5–16)
IMM GRANULOCYTES # BLD AUTO: 0 K/UL (ref 0–0.04)
IMM GRANULOCYTES NFR BLD AUTO: 0 % (ref 0–0.5)
LYMPHOCYTES # BLD: 1.6 K/UL (ref 0.8–3.5)
LYMPHOCYTES NFR BLD: 30 % (ref 12–49)
MAGNESIUM SERPL-MCNC: 1.9 MG/DL (ref 1.6–2.4)
MCH RBC QN AUTO: 29.1 PG (ref 26–34)
MCHC RBC AUTO-ENTMCNC: 34.1 G/DL (ref 30–36.5)
MCV RBC AUTO: 85.2 FL (ref 80–99)
MONOCYTES # BLD: 0.7 K/UL (ref 0–1)
MONOCYTES NFR BLD: 12 % (ref 5–13)
NEUTS SEG # BLD: 2.9 K/UL (ref 1.8–8)
NEUTS SEG NFR BLD: 54 % (ref 32–75)
NRBC # BLD: 0 K/UL (ref 0–0.01)
NRBC BLD-RTO: 0 PER 100 WBC
P-R INTERVAL, ECG05: 148 MS
PLATELET # BLD AUTO: 107 K/UL (ref 150–400)
PMV BLD AUTO: 11.5 FL (ref 8.9–12.9)
POTASSIUM SERPL-SCNC: 3.9 MMOL/L (ref 3.5–5.1)
PROT SERPL-MCNC: 5.5 G/DL (ref 6.4–8.2)
Q-T INTERVAL, ECG07: 556 MS
QRS DURATION, ECG06: 82 MS
QTC CALCULATION (BEZET), ECG08: 424 MS
RBC # BLD AUTO: 4.06 M/UL (ref 3.8–5.2)
SERVICE CMNT-IMP: NORMAL
SODIUM SERPL-SCNC: 140 MMOL/L (ref 136–145)
T3FREE SERPL-MCNC: 3.6 PG/ML (ref 2.2–4)
VENTRICULAR RATE, ECG03: 35 BPM
WBC # BLD AUTO: 5.4 K/UL (ref 3.6–11)

## 2019-04-17 PROCEDURE — 74011250636 HC RX REV CODE- 250/636: Performed by: INTERNAL MEDICINE

## 2019-04-17 PROCEDURE — 82533 TOTAL CORTISOL: CPT

## 2019-04-17 PROCEDURE — 65660000000 HC RM CCU STEPDOWN

## 2019-04-17 PROCEDURE — 36415 COLL VENOUS BLD VENIPUNCTURE: CPT

## 2019-04-17 PROCEDURE — 85025 COMPLETE CBC W/AUTO DIFF WBC: CPT

## 2019-04-17 PROCEDURE — 74011000250 HC RX REV CODE- 250: Performed by: INTERNAL MEDICINE

## 2019-04-17 PROCEDURE — 83735 ASSAY OF MAGNESIUM: CPT

## 2019-04-17 PROCEDURE — 93306 TTE W/DOPPLER COMPLETE: CPT

## 2019-04-17 PROCEDURE — 80053 COMPREHEN METABOLIC PANEL: CPT

## 2019-04-17 RX ADMIN — LEVOFLOXACIN 750 MG: 5 INJECTION, SOLUTION INTRAVENOUS at 21:37

## 2019-04-17 RX ADMIN — SODIUM CHLORIDE, SODIUM LACTATE, POTASSIUM CHLORIDE, AND CALCIUM CHLORIDE 125 ML/HR: 600; 310; 30; 20 INJECTION, SOLUTION INTRAVENOUS at 07:41

## 2019-04-17 RX ADMIN — COSYNTROPIN 0.25 MG: 0.25 INJECTION, POWDER, LYOPHILIZED, FOR SOLUTION INTRAMUSCULAR; INTRAVENOUS at 16:19

## 2019-04-17 RX ADMIN — Medication 10 ML: at 21:37

## 2019-04-17 RX ADMIN — Medication 5 ML: at 14:00

## 2019-04-17 NOTE — PROGRESS NOTES
Endocrinology Note Consult requested by Dr Teofilo Landry for abnormal thyroid test results on this 25 y.o. female who presented with nephrolithiasis, bradycardia, and hypotension. On initial evaluation, TSH was elevated to 36 and presentation was concerning for myxedema (given bradycardia to 30s) so 200 mcg IV levothyroxine was administered. FT4 later returned elevated, which is discrepant with the high TSH (note this was drawn prior to administration of IV levothyroxine). DDx is euthyroid sick syndrome, TSH producing adenoma (rare), or TSH resistance. Recommended the overnight admitting physician obtain a thyroid antibody panel, FT3, and consider adding alpha-subunit, hold off on further levothyroxine administration until additional labs return today. Component Latest Ref Rng & Units 4/16/2019 4/16/2019 5:15 PM  5:15 PM  
TSH 
    0.36 - 3.74 uIU/mL  36.60 (H) T4, Free 0.8 - 1.5 NG/DL 1.6 (H) Myron Allen MD 
[facility] [address] [place] [address]

## 2019-04-17 NOTE — CONSULTS
[address] [place] [address] [facility]     Date of  Admission: 4/16/2019  7:24 AM     Admission type:Emergency    Consult for: bradycardia  Consult by: hospitalist     Subjective:     Aura Warren is a 25 y.o. female admitted for Intractable vomiting [R11.10], Hypotension [I95.9], and flank pain, kidney stone. No previous cardiac hx. On admission ECG SB at 35bpm.  Initially suspected that the bradycardia was r/t pain response, and also receiving IV narcotics for pain, but did not resolve. Continues with bradycardia. Patient denies currently and previous lightheadedness, dizziness, syncope, SOB, CP. The only significant abnormal lab is TSH at 36. Patient was dx with hypothyroidism in the past but did not start any medications. Cardiac risk factors: smoking/ tobacco exposure, stress, use of marijuana. Patient Active Problem List    Diagnosis Date Noted    Bradycardia 04/17/2019    Flank pain 04/17/2019    Intractable vomiting 04/16/2019    Hypotension 04/16/2019      None  Past Medical History:   Diagnosis Date    Bradycardia 4/17/2019    Flank pain 4/17/2019      Social History     Socioeconomic History    Marital status: SINGLE     Spouse name: Not on file    Number of children: Not on file    Years of education: Not on file    Highest education level: Not on file     No Known Allergies   History reviewed. No pertinent family history.    Current Facility-Administered Medications   Medication Dose Route Frequency    sodium chloride (NS) flush 5-40 mL  5-40 mL IntraVENous Q8H    sodium chloride (NS) flush 5-40 mL  5-40 mL IntraVENous PRN    acetaminophen (TYLENOL) tablet 650 mg  650 mg Oral Q4H PRN    ondansetron (ZOFRAN) injection 4 mg  4 mg IntraVENous Q4H PRN    lactated Ringers infusion  125 mL/hr IntraVENous CONTINUOUS    ketorolac (TORADOL) injection 30 mg  30 mg IntraVENous Q6H PRN    levoFLOXacin (LEVAQUIN) 750 mg in D5W IVPB  750 mg IntraVENous Q24H        Review of Symptoms:   11 systems reviewed, negative other than as stated in the HPI        Objective:      Visit Vitals  /61   Pulse (!) 52   Temp 98.8 °F (37.1 °C)   Resp 18   Ht 5' 2\" (1.575 m)   Wt 105 lb (47.6 kg)   SpO2 98%   Breastfeeding? No   BMI 19.20 kg/m²       Physical:   General:   Heart: RRR, no m/S3/JVD, no carotid bruits   Lungs: clear   Abdomen: Soft, +BS, NTND   Extremities: LE guanako +DP/PT, no edema   Neurologic: Grossly normal    Data Review:   Recent Labs     04/17/19  0528 04/16/19  0737   WBC 5.4 4.7   HGB 11.8 14.6   HCT 34.6* 41.9   * 177     Recent Labs     04/17/19  0528 04/16/19  0737    139   K 3.9 3.4*   * 104   CO2 23 25   GLU 91 113*   BUN 13 18   CREA 0.66 1.02   CA 8.0* 9.4   MG 1.9  --    ALB 2.8* 4.1   TBILI 0.6 0.5   SGOT 39* 50*   ALT 47 65       No results for input(s): TROIQ, CPK, CKMB in the last 72 hours. Intake/Output Summary (Last 24 hours) at 4/17/2019 2132  Last data filed at 4/17/2019 0439  Gross per 24 hour   Intake 1054.17 ml   Output    Net 1054.17 ml        Cardiographics    Telemetry: SB  ECG: SB with no acute changes  Echocardiogram: preliminary results with normal EF, no WMA    CXRAY: no acute process       Assessment:       Principal Problem:    Flank pain (4/17/2019)    Active Problems:    Intractable vomiting (4/16/2019)      Hypotension (4/16/2019)      Bradycardia (4/17/2019)         Plan:     Bradycardia:  Asymptomatic in this young, healthy female  May be r/t hypothyroidism which is now being treated and followed by Endocrinologist  No need for further cardiology evaluation or medical management. Hopefully bradycardia will resolve. Will instruct patient to monitor her HR at home. Continue to monitor telemetry. Will follow with you.     Initial hypotension resolved, possibly related to complicated UTI with pyelonephritis    Thank you for consulting YANCI Shafer MD     Patient seen and examined by me with nurse practitioner Corine Jacobson. I personally performed all components of the history, physical, and medical decision making and agree with the assessment and plan with minor modifications as noted. Asymptomatic sinus bradycardia with no signal suspect this may relate to hypothyroidism, or perhaps just the fact that she is a young, healthy female. No further evaluation needed at this time. We will check up on her tomorrow to make sure her telemetry did not show any more impressive abnormalities. I suspect initial hypotension probably related to UTI with pyelonephritis. Resolved. Otherwise, follow-up with me within about a month. At that time thyroid should be better compensated.

## 2019-04-17 NOTE — ROUTINE PROCESS
TRANSFER - IN REPORT: 
 
Verbal report received from Ross Cabrera RN on JAYY  being received from ED for routine progression of care Report consisted of patients Situation, Background, Assessment and  
Recommendations(SBAR). Information from the following report(s) SBAR, Kardex, ED Summary, Procedure Summary, MAR and Recent Results was reviewed with the receiving nurse. Opportunity for questions and clarification was provided. Assessment completed upon patients arrival to unit and care assumed.

## 2019-04-17 NOTE — PROGRESS NOTES
Hospitalist Progress Note NAME: Leon Looney :  1990 MRN:  [id number] Assessment / Plan: Hypotension with bradycardia POA In settings of TSH 36 with normal Total T4 and Equivalent Free T4 S/p IV synthroid, holding further as per Endo recommendations Awaiting Tyroid antibody panel Endocrinology is on the case and believe euthyroid sick syndrome, TSH producing adenoma (rare), or TSH resistance Awaiting 2D echo Cardiology Consult Remain bradycardic Continue tele monitoring Left kidney stone with mild hydro Complicated UTI / Pyelo Seems to have passed stone as Nausea, vomiting and pain resolved Continue IVF due to BP issue Continue IV Levaquin and f/u cultures 
  
Mild Hypokalemia Repleted Monitor on LR 
  
Incidental findings on CT A/P: Concern for a hypodense lesion in the dome of the liver. Prominent gastrohepatic and retroperitoneal lymph nodes of uncertain etiology and clinical significance. Correlation with nonemergent contrast-enhanced CT is recommended. \" 
LFT's wnl, US done but per radiology not to be the test for this study 
discussed on admission with patient the Incidental Lymphadenopathy noted on CT - ?related to Hypothyroidism as well as patient's 2.6 cm right ovarian cyst  
  
Code Status: Full Surrogate Decision Maker:  
  
DVT Prophylaxis: SCDs  
n Subjective: Pt seen and examined at bedside. Left flank pain resolved. Feel lightheaded upon walking. Overnight events d/w RN  
 
CHIEF COMPLAINT: f/u \"left flank pain\" 
  
Review of Systems: 
Symptom Y/N Comments  Symptom Y/N Comments Fever/Chills n   Chest Pain Poor Appetite    Edema Cough n   Abdominal Pain n   
Sputum    Joint Pain SOB/REYNOSO n   Pruritis/Rash Nausea/vomit    Tolerating PT/OT Diarrhea    Tolerating Diet y Constipation    Other Could NOT obtain due to:   
 
Objective: VITALS:  
Last 24hrs VS reviewed since prior progress note. Most recent are: Patient Vitals for the past 24 hrs: 
 Temp Pulse Resp BP SpO2  
04/17/19 1229    101/50   
04/17/19 1106 98.4 °F (36.9 °C) (!) 48 18 117/49 100 % 04/17/19 0839    101/50   
04/17/19 0814  (!) 49     
04/17/19 0734 98.8 °F (37.1 °C) (!) 42 18 95/54 96 % 04/17/19 0527 98.6 °F (37 °C) 60 18 99/56 99 % 04/16/19 2244 98.3 °F (36.8 °C)  18 113/71 100 % 04/16/19 2215    91/52   
04/16/19 2200  66 22 (!) 86/58 100 % 04/16/19 2123  (!) 58 21 (!) 87/43 100 % 04/16/19 2022  (!) 50 12 (!) 79/49 100 % 04/16/19 2000 98.1 °F (36.7 °C) (!) 39 11 (!) 68/35 99 % 04/16/19 1800 98.1 °F (36.7 °C) (!) 38 12 (!) 84/49 100 % 04/16/19 1645  (!) 46 12 (!) 83/48 100 % 04/16/19 1600    (!) 83/48 100 % 04/16/19 1508    (!) 81/54 99 % 04/16/19 1455    (!) 88/48 100 % 04/16/19 1300    (!) 82/47 100 % Intake/Output Summary (Last 24 hours) at 4/17/2019 1243 Last data filed at 4/17/2019 7586 Gross per 24 hour Intake 1054.17 ml Output  Net 1054.17 ml PHYSICAL EXAM: 
General: WD, WN. Alert, cooperative, no acute distress   
EENT:  EOMI. Anicteric sclerae. MMM Resp:  CTA bilaterally, no wheezing or rales. No accessory muscle use CV:  Regular  rhythm,  No edema GI:  Soft, Non distended, Non tender.  +Bowel sounds Neurologic:  Alert and oriented X 3, normal speech, Psych:   Good insight. Not anxious nor agitated Skin:  No rashes. No jaundice Reviewed most current lab test results and cultures  YES Reviewed most current radiology test results   YES Review and summation of old records today    NO Reviewed patient's current orders and MAR    YES 
PMH/SH reviewed - no change compared to H&P 
________________________________________________________________________ Care Plan discussed with: 
  Comments Patient y Family RN y   
Care Manager Consultant                   Multidiciplinary team rounds were held today with case manager, nursing, pharmacist and clinical coordinator. Patient's plan of care was discussed; medications were reviewed and discharge planning was addressed. ________________________________________________________________________ Total NON critical care TIME:  35 Minutes Total CRITICAL CARE TIME Spent:   Minutes non procedure based Comments >50% of visit spent in counseling and coordination of care    
________________________________________________________________________ Mera Wisdom MD  
 
Procedures: see electronic medical records for all procedures/Xrays and details which were not copied into this note but were reviewed prior to creation of Plan. LABS: 
I reviewed today's most current labs and imaging studies. Pertinent labs include: 
Recent Labs 04/17/19 
7640 04/16/19 
4271 WBC 5.4 4.7 HGB 11.8 14.6 HCT 34.6* 41.9 * 177 Recent Labs 04/17/19 
8866 04/16/19 
0996  139  
K 3.9 3.4*  
* 104 CO2 23 25 GLU 91 113* BUN 13 18 CREA 0.66 1.02  
CA 8.0* 9.4 MG 1.9  --   
ALB 2.8* 4.1 TBILI 0.6 0.5 SGOT 39* 50* ALT 47 65 Signed: Mera Wisdom MD

## 2019-04-17 NOTE — PROGRESS NOTES
TRANSFER - OUT REPORT: 
 
Verbal report given to Monica Hanley RN(name) on JAYY  being transferred to [place], RN(unit) for routine progression of care Report consisted of patients Situation, Background, Assessment and  
Recommendations(SBAR). Information from the following report(s) SBAR, Kardex, Intake/Output, MAR and Cardiac Rhythm SB was reviewed with the receiving nurse. Lines:  
Peripheral IV 04/16/19 Anterior;Right Forearm (Active) Opportunity for questions and clarification was provided. Patient transported with: 
 [employer]

## 2019-04-17 NOTE — PROGRESS NOTES
Bedside and Verbal shift change report given to sarah RN (oncoming nurse) by Namrata RN (offgoing nurse). Report included the following information SBAR, Kardex, Recent Results, Med Rec Status and Cardiac Rhythm SB. 
  
  
   
Zone Phone:   [phone] 
   
   
Significant changes during shift:  New admit 
  
  
Patient Information 
   
25 yr old, admitted on 4/16/19, patient of Dr. Marilu Nevarez, admitted from home 
  [facility] 
Problem List 
   
PMH: 
History of flu in March 2019 History of kidney stone Premature at birth History of Heart Murmur ? Hypothyroidism, states taht she is not on treatment because she told that treatment was optional to start but after she started she would have to stay on 
  
Social History  
  
       
Tobacco Use  Smoking status: Occasional tobacco and marijuana Substance Use Topics  Alcohol use: Social  
  
Family History: denies family history of CAD, CHF, Autoimmune Disease; Father with Alcoholism. 
  
Allergies: Chocolate makes the inside of patient's mouth peel 
  
  
  
   
  
   
Activity Status: 
   
OOB to Chair:  N Ambulated this shift N Bed Rest Y 
   
Supplemental I3: (DW Applicable) 
   
Room air    
   
LINES AND DRAINS: 
   
PIV 
  
  
DVT prophylaxis: 
   
DVT prophylaxis Med- N 
DVT prophylaxis SCD or DAYRON- Y 
   
Wounds: (If Applicable) 
   
Wounds- None   
Location     
Patient Safety: 
   
Falls Score Total Score:  1 Safety Level_______ Bed Alarm On? N Sitter? N 
   
Plan for upcoming shift: Echo   
   
Discharge Plan: TBD 
  
  
   
Active Consults:  Endocrinology 
  
   
  
 
  
  
Revision History View Details Report

## 2019-04-17 NOTE — ROUTINE PROCESS
Bedside and Verbal shift change report given to Royal Dahl RN (oncoming nurse) by Luwana Burkitt nurseMata. Report included the following information SBAR, Kardex, Recent Results, Med Rec Status and Cardiac Rhythm SB. 
 
 
   
Zone Phone:   [phone] 
   
   
Significant changes during shift:  New admit Patient Information 
   
25 yr old, admitted on 4/16/19, patient of Dr. Merlene Ortega, admitted from home Fleurette Bottoms 
Problem List 
   
PMH: 
History of flu in March 2019 History of kidney stone Premature at birth History of Heart Murmur ? Hypothyroidism, states taht she is not on treatment because she told that treatment was optional to start but after she started she would have to stay on 
  
Social History  
  
    
Tobacco Use  Smoking status: Occasional tobacco and marijuana Substance Use Topics  Alcohol use: Social  
  
Family History: denies family history of CAD, CHF, Autoimmune Disease; Father with Alcoholism. 
  
Allergies: Chocolate makes the inside of patient's mouth peel 
 
 
 
   
 
   
Activity Status: 
   
OOB to Chair:  N Ambulated this shift N Bed Rest Y 
   
Supplemental D3: (FO Applicable) 
   
Room air    
   
LINES AND DRAINS: 
   
PIV 
 
 
DVT prophylaxis: 
   
DVT prophylaxis Med- N 
DVT prophylaxis SCD or DAYRON- Y 
   
Wounds: (If Applicable) 
   
Wounds- None   
Location     
Patient Safety: 
   
Falls Score Total Score:  1 Safety Level_______ Bed Alarm On? N Sitter?  N 
   
Plan for upcoming shift: Echo   
   
Discharge Plan: TBD 
 
 
   
Active Consults:  Endocrinology

## 2019-04-17 NOTE — CONSULTS
CONSULTATION REQUESTED BY: Sav Wood MD    REASON FOR CONSULT: abnormal thyroid blood test / hypothryoidism    CHIEF COMPLAINT: abnormal thyroid blood test / hypothyroidism    HISTORY OF PRESENT ILLNESS:   Bandar Qureshi is a 25 y.o. female with a PMHx as noted below who was admitted to the hospital on 4/16/2019  7:24 AM with a diagnosis of Intractable vomiting; Hypotension. Endocrinology was consulted for the evaluation of abnormal thyroid blood test / hypothyroidism. Patient visited and examined. History obtained. Patient noted for admission due to flank pain with suggestive kidney stone. During her admission she was noted for several abnormalities including low blood pressure, low heart rate, and unusual thyroid panel. Her TSH level was elevated, however her FT4 level was also elevated. The patient is not certain about the presence of infection but reports being told about abnormal lymph nodes. Upon questioning she does admit to a possible dental infection, noting that her tooth has been broken for about 6 months and may be infected, never having received dental examination/attention. She also admits to as associated sinus pain around her cheek and eye on the same side of the face. Upon further questioning the patient does admit to a history of hypothyroidism. She reports that during her first pregnancy in 2010 she had a temporary hypothyroidism that was treated and resolved after delivery. Then during her second pregnancy in 2012 she experienced it again but it did not resolve after delivery and she was continued on levothyroxine. She reports however that she had not been taking it consistently and describes being told \" either take it or don't take it at all \" and so she reports having discontinued it completely in 2013. She denies having had access to thyroid hormone and especially recently. On admission she is noted for an elevated TSH, and elevated FT4, high-normal T3.      Results for Vish Garcia (MRN [id number]) as of 4/17/2019 14:37   Ref. Range 4/16/2019 17:15 4/16/2019 21:52 4/16/2019 22:12 4/17/2019 05:28 4/17/2019 12:29   Cortisol, a.m. Latest Ref Range: 4.30 - 22.45 ug/dL    7.8    Free Triiodothyronine (T3) Latest Ref Range: 2.2 - 4.0 pg/mL   3.6     T4, Free Latest Ref Range: 0.8 - 1.5 NG/DL 1.6 (H)       TSH Latest Ref Range: 0.36 - 3.74 uIU/mL 36.60 (H)       Glucose Latest Ref Range: 65 - 100 mg/dL    91            PAST MEDICAL/SURGICAL HISTORY:   Past Medical History:   Diagnosis Date    Bradycardia 4/17/2019    Flank pain 4/17/2019     History reviewed. No pertinent surgical history.     ALLERGIES:   No Known Allergies    MEDICATIONS ON ADMISSION:     Current Facility-Administered Medications:     sodium chloride (NS) flush 5-40 mL, 5-40 mL, IntraVENous, Q8H, Ernestina Romero MD, 10 mL at 04/16/19 2009    sodium chloride (NS) flush 5-40 mL, 5-40 mL, IntraVENous, PRN, Ernestina Kidd MD    acetaminophen (TYLENOL) tablet 650 mg, 650 mg, Oral, Q4H PRN, Ernestina Romero MD    ondansetron (ZOFRAN) injection 4 mg, 4 mg, IntraVENous, Q4H PRN, Ernestina Romero MD    lactated Ringers infusion, 125 mL/hr, IntraVENous, CONTINUOUS, Ernestina Romero MD, Last Rate: 125 mL/hr at 04/17/19 0741, 125 mL/hr at 04/17/19 0741    ketorolac (TORADOL) injection 30 mg, 30 mg, IntraVENous, Q6H PRN, Ernestina Romero MD    levoFLOXacin (LEVAQUIN) 750 mg in D5W IVPB, 750 mg, IntraVENous, Q24H, Ernestina Romero MD, Last Rate: 100 mL/hr at 04/16/19 2256, 750 mg at 04/16/19 2256    SOCIAL HISTORY:   Social History     Socioeconomic History    Marital status: SINGLE     Spouse name: Not on file    Number of children: Not on file    Years of education: Not on file    Highest education level: Not on file   Occupational History    Not on file   Social Needs    Financial resource strain: Not on file    Food insecurity:     Worry: Not on file     Inability: Not on file   Plated needs: Medical: Not on file     Non-medical: Not on file   Tobacco Use    Smoking status: Not on file   Substance and Sexual Activity    Alcohol use: Not on file    Drug use: Not on file    Sexual activity: Not on file   Lifestyle    Physical activity:     Days per week: Not on file     Minutes per session: Not on file    Stress: Not on file   Relationships    Social connections:     Talks on phone: Not on file     Gets together: Not on file     Attends [employer] service: Not on file     Active member of club or organization: Not on file     Attends meetings of clubs or organizations: Not on file     Relationship status: Not on file    Intimate partner violence:     Fear of current or ex partner: Not on file     Emotionally abused: Not on file     Physically abused: Not on file     Forced sexual activity: Not on file   Other Topics Concern    Not on file   Social History Narrative    Not on file       FAMILY HISTORY:  History reviewed. No pertinent family history. REVIEW OF SYSTEMS: Complete ROS assessed and noted for that which is described above, all else are negative. Eyes: normal  ENT: normal  CVS: normal  Resp: normal  GI: normal  : normal  GYN: normal  Endocrine: normal  Integument: normal  Musculoskeletal: normal  Neuro: normal  Psych: normal    PHYSICAL EXAMINATION:    VITAL SIGNS:  Visit Vitals  /50   Pulse (!) 48   Temp 98.4 °F (36.9 °C)   Resp 18   Ht 5' 2\" (1.575 m)   Wt 105 lb (47.6 kg)   SpO2 100%   Breastfeeding?  No   BMI 19.20 kg/m²       GENERAL: NCAT, Sitting comfortably, NAD  EYES: EOMI, non-icteric, no proptosis  Ear/Nose/Throat: NCAT, no inflammation, no masses  LYMPH NODES: No LAD  CARDIOVASCULAR: no JVD, No edema, normal peripheral perfusion  RESPIRATORY: no cyanosis, normal chest expansion, comfortable respirations  GASTROINTESTINAL: soft NT, ND,  MUSCULOSKELETAL: Normal ROM, no atrophy  SKIN: warm, no edema/rash/ or other skin changes  NEUROLOGIC: 5/5 power all extremities, no tremors, AAOx3  PSYCHIATRIC: Normal affect, Normal insight and judgement    REVIEW OF LABORATORY AND RADIOLOGY DATA:   Labs and documentation have been reviewed as described above. ASSESSMENT AND PLAN:   Jina Webster is a 25 y.o. female with a PMHx as noted below who was admitted to the hospital on 4/16/2019  7:24 AM with a diagnosis of Intractable vomiting; Hypotension. Endocrinology was consulted for the evaluation of abnormal thyroid blood test.    Diagnoses:  Abnormal thyroid blood test  History of hypothyroidism  Possible acute on chronic thyroiditis  Bradycardia / hypotension    Assessment:  Patient is a 25 F with a history of hypothyroidism during 2 pregnancies, previously non-adherent to thyroid hormone replacement and having been off levothyroxine since 2013. She does admit to a broken tooth that was never evaluated with some associated sinus pain on the same side. There is thus increased suspicion for acute thyroiditis due to possible sinus/dental infection on the background of chronic thyroiditis / hypothyroidism. Her TSH is elevated due to the chronic hypothyroidism due to chronic lymphocytic thyroiditis most likely, however acute infections (often dental/sinus infections) will cause an acute inflammatory response in the thyroid resulting in a burst/release of residual thyroid hormone from the thyroid gland, temporarily raising the FT4/T3 levels. Patient has already received 200 mcg of levothyroxine however I would recommend not treating with thyroid hormone while the thyroid hormone level is actually high. TSH takes time to come down / respond to elevations in FT4 and thus suggesting the acute nature of her condition. The best action at this time regarding her thyroid is to monitor without treatment for now and repeat her thyroid levels again in the outpatient setting about 2 weeks after discharge to assure the trend is ideal and reassuring.  I do not suspect the hypotension and bradycardia are related to her her thyroid, as elevated thyroid hormone typically results in tachycardia etc.. Hypothyroidism (low thyroid hormone) can cause respiratory and cardiac suppression, however her levels are elevated. Plan:  1. HOLD all thyroid medications  2. Repeat thyroid levels at [facility] in 2 weeks, ordered  3. Will need to start levothyroxine in the outpatient setting when labs suggest appropriateness for it  4. Recommend an ACTH stimulation test to exclude adrenal insufficiency, ordered    I would not count on her basal level alone in this particular case  5. She needs dental attention to her broken tooth as it may result in advancing dental / sinus / CNS infections  6. I will have her follow up with me in the clinic after discharge, for her thyroid. Thank you, will continue to follow along,    Shanta Villegas.  [id number] [facility]

## 2019-04-17 NOTE — PROGRESS NOTES
Patients MEWS score 3. Patient alert and oriented with no complaints. HR 42 and BP 95/54 Dr. Kalie Saavedra. Orders received for Cardiology Consult. Cardiology consult in to Dr. Catarina Allen.

## 2019-04-18 ENCOUNTER — TELEPHONE (OUTPATIENT)
Dept: ENDOCRINOLOGY | Age: 29
End: 2019-04-18

## 2019-04-18 VITALS
TEMPERATURE: 98.5 F | DIASTOLIC BLOOD PRESSURE: 74 MMHG | HEIGHT: 62 IN | WEIGHT: 105 LBS | OXYGEN SATURATION: 100 % | BODY MASS INDEX: 19.32 KG/M2 | RESPIRATION RATE: 18 BRPM | HEART RATE: 69 BPM | SYSTOLIC BLOOD PRESSURE: 116 MMHG

## 2019-04-18 LAB
ANION GAP SERPL CALC-SCNC: 4 MMOL/L (ref 5–15)
BUN SERPL-MCNC: 8 MG/DL (ref 6–20)
BUN/CREAT SERPL: 9 (ref 12–20)
CALCIUM SERPL-MCNC: 8.8 MG/DL (ref 8.5–10.1)
CHLORIDE SERPL-SCNC: 108 MMOL/L (ref 97–108)
CO2 SERPL-SCNC: 28 MMOL/L (ref 21–32)
CREAT SERPL-MCNC: 0.86 MG/DL (ref 0.55–1.02)
GLUCOSE SERPL-MCNC: 108 MG/DL (ref 65–100)
POTASSIUM SERPL-SCNC: 3.6 MMOL/L (ref 3.5–5.1)
SODIUM SERPL-SCNC: 140 MMOL/L (ref 136–145)
THYROGLOB AB SERPL-ACNC: 2 IU/ML (ref 0–0.9)
THYROPEROXIDASE AB SERPL-ACNC: 18 IU/ML (ref 0–34)

## 2019-04-18 PROCEDURE — 36415 COLL VENOUS BLD VENIPUNCTURE: CPT

## 2019-04-18 PROCEDURE — 80048 BASIC METABOLIC PNL TOTAL CA: CPT

## 2019-04-18 RX ORDER — CEPHALEXIN 500 MG/1
500 CAPSULE ORAL 3 TIMES DAILY
Qty: 9 CAP | Refills: 0 | Status: SHIPPED | OUTPATIENT
Start: 2019-04-18 | End: 2019-04-18

## 2019-04-18 RX ORDER — CIPROFLOXACIN 500 MG/1
500 TABLET ORAL 2 TIMES DAILY
Qty: 10 TAB | Refills: 0 | Status: SHIPPED | OUTPATIENT
Start: 2019-04-18 | End: 2019-04-23

## 2019-04-18 RX ADMIN — Medication 10 ML: at 05:31

## 2019-04-18 NOTE — PROGRESS NOTES
[address], [address] [facility]  [phone] 621 [facility] Subjective:  
 
Ms. Vianca Berg has no complaints. Echo EF 65% normal.  
 
No Known Allergies History reviewed. No pertinent family history. Current Facility-Administered Medications Medication Dose Route Frequency  sodium chloride (NS) flush 5-40 mL  5-40 mL IntraVENous Q8H  
 sodium chloride (NS) flush 5-40 mL  5-40 mL IntraVENous PRN  
 acetaminophen (TYLENOL) tablet 650 mg  650 mg Oral Q4H PRN  
 ondansetron (ZOFRAN) injection 4 mg  4 mg IntraVENous Q4H PRN  
 lactated Ringers infusion  125 mL/hr IntraVENous CONTINUOUS  
 ketorolac (TORADOL) injection 30 mg  30 mg IntraVENous Q6H PRN  
 levoFLOXacin (LEVAQUIN) 750 mg in D5W IVPB  750 mg IntraVENous Q24H Objective:  
  
Visit Vitals /74 (BP 1 Location: Right arm, BP Patient Position: Sitting) Pulse 69 Temp 98.5 °F (36.9 °C) Resp 18 Ht 5' 2\" (1.575 m) Wt 47.6 kg (105 lb) SpO2 100% Breastfeeding? No  
BMI 19.20 kg/m² Physical:  
General: thin,young  female in no acute distress Heart: estuardo, no m/S3/JVD, no carotid bruits Lungs: clear Abdomen: Soft, +BS, NTND Extremities: LE guanako +DP/PT, no edema Neurologic: Grossly normal 
 
Data Review:  
Recent Labs 04/17/19 
3900 04/16/19 
0854 WBC 5.4 4.7 HGB 11.8 14.6 HCT 34.6* 41.9 * 177 Recent Labs 04/18/19 
0300 04/17/19 
0528 04/16/19 
1314  140 139  
K 3.6 3.9 3.4*  
 111* 104 CO2 28 23 25 * 91 113* BUN 8 13 18 CREA 0.86 0.66 1.02  
CA 8.8 8.0* 9.4 MG  --  1.9  --   
ALB  --  2.8* 4.1 TBILI  --  0.6 0.5 SGOT  --  39* 50* ALT  --  47 65 No results for input(s): TROIQ, CPK, CKMB in the last 72 hours. Intake/Output Summary (Last 24 hours) at 4/18/2019 1403 Last data filed at 4/18/2019 4449 Gross per 24 hour Intake 1400 ml Output  Net 1400 ml Cardiographics Telemetry: SB Assessment:  
  
 Principal Problem: 
  Flank pain (4/17/2019) Active Problems: 
  Intractable vomiting (4/16/2019) Hypotension (4/16/2019) Bradycardia (4/17/2019) Plan:  
 
Bradycardia: 
Asymptomatic in this young, healthy female May be r/t hypothyroidism which is now being treated and followed by Endocrinologist 
No need for further cardiology evaluation or medical management. Remain bradycardic with higher rate of 45-60s, BP stable. Will instruct patient to monitor her HR at home. Continue to monitor telemetry. Initial hypotension resolved, possibly related to complicated UTI with pyelonephritis Follow up with Dr. Miguel Hardy in 1 month. RCA signing off. Chanell Gil ACNP Patient seen and examined by me with nurse practitioner Chanell Gil. I personally performed all components of the history, physical, and medical decision making and agree with the assessment and plan with minor modifications as noted. Hypotension resolved, no severe estuardo. May relate to hypothyroid or good conditioning/ young age. Ran regularly up to 2 years ago. F/u in about a month. Thanks for the consult.

## 2019-04-18 NOTE — PROGRESS NOTES
Endocrine Chart Review Labs reviewed:  
Normal ACTH stimulation test which is reassuring. No evidence of adrenal insufficiency as a cause of hypotension and bradycardia. Likewise it is also less likely that it is related to her primary thyroid condition as her thyroid levels are elevated. Underlying infection or other cause would be more suspicious at this time. Regarding her thyroid, please see yesterdays consult note. Thyroid levels not currently low, in fact FT4 elevated and T3 high-normal range. As TSH is also elevated (knowing she has poorly treated chronic hypothyroidism), the elevation in her thyroid hormones are likely due to acute on chronic thyroiditis and is expected to resolve. I have ordered 2 week outpatient labs to be completed at [facility] which I will review when completed, and I will have her follow up with me in the clinic. Thanks, Chele Lopes. [address] [facility] [employer]

## 2019-04-18 NOTE — PROGRESS NOTES
Reason for Admission: \"Kidney stone\"  - patient also had low BP and bradycardia - states she \"feels constipated\" RRAT Score:          4 Plan for utilizing home health:      No current needs for home health - patient up on couch in patient room - walking about room independently - alert and oriented x 4 Current Advanced Directive/Advance Care Plan: No current Advanced Directive - offered Advanced Care planning - patient would like to discuss with her mom, step-dad and boyfriend prior to completion of paperwork Likelihood of Readmission:  Low Transition of Care Plan: *CM in to see patient for verification of all demographic information and PCP 
 *Patient to follow-up with Cardiology, Urology, Endocrinology and PCP *PCP verified as Dr. Donya Jerome with [facility] in [place] - appts placed on AVS. 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 She lives in private home w/ boyfriend - patient drives and family can assist with all care if needed. Patient has mom and step-father that also assist with care if necessary. Patient plans to follow-up with all appts as scheduled and states she has transporation. No problems with obtaining medications - states she will use [employer]. Care Management Interventions PCP Verified by CM: Yes(Dr. MANZO/ [place] [facility]) Mode of Transport at Discharge: Other (see comment)(Boyfriend to  for transport home) Transition of Care Consult (CM Consult): Other(Iniital CM Assessment and D/C coordination) Current Support Network: Own Home(Patient lives w/ boyfriend - 3 children she home-schools) Confirm Follow Up Transport: Self(Patient drives or family can assist with transport) Plan discussed with Pt/Family/Caregiver: Yes Discharge Location Discharge Placement: Home(Home - follow-up appts with urology, cardiology, endocrinology and PCP) Jt Denise RN, BSN, ACM [address] [facility]   [phone]

## 2019-04-18 NOTE — PROGRESS NOTES
Patient provided with discharge instructions, medication list, self care information, and follow up appointments. Patient discharging  to home with self care; transported by family in private vehicle.

## 2019-04-18 NOTE — DISCHARGE INSTRUCTIONS
HOSPITALIST DISCHARGE INSTRUCTIONS    NAME: Leon Looney   :  1990   MRN:  [id number]     Date/Time:  2019 2:15 PM    ADMIT DATE: 2019     DISCHARGE DATE: 2019     DISCHARGE DIAGNOSIS:  Hypotension with bradycardia   Thyroiditis  Left kidney stone with mild hydro  UTI rule out  Mild Hypokalemia  Incidental findings on CT A/P    MEDICATIONS:  · It is important that you take the medication exactly as they are prescribed. · Keep your medication in the bottles provided by the pharmacist and keep a list of the medication names, dosages, and times to be taken in your wallet. · Do not take other medications without consulting your doctor. Pain Management: per above medications    What to do at Home    Recommended diet:  Resume previous diet    Recommended activity: Activity as tolerated    If you have questions regarding the hospital related prescriptions or hospital related issues [facility] [phone]. If you experience any of the following symptoms then please call your primary care physician or return to the emergency room if you cannot get hold of your doctor:  Fever, chills, nausea, vomiting, diarrhea, change in mentation, falling, bleeding, shortness of breath      Information obtained by :  I understand that if any problems occur once I am at home I am to contact my physician. I understand and acknowledge receipt of the instructions indicated above.                                                                                                                                            Physician's or R.N.'s Signature                                                                  Date/Time                                                                                                                                              Patient or Representative Signature Date/Time

## 2019-04-18 NOTE — ROUTINE PROCESS
[facility] Progress Notes Signed Date of Service:  04/18/19 1410 []Hide copied text []Deedee for details Bedside and Verbal shift change report given to tio RN (oncoming nurse) by Tyler RN (offgoing nurse). Report included the following information SBAR, Kardex, Recent Results, Med Rec Status and Cardiac Rhythm SB. 
  
  
   
Zone Phone:   [phone] 
   
   
Significant changes during shift:  None 
  
  
Patient Information 
Hina stearns, admitted on 4/16/19, patient of Dr. Romero, admitted from home 
  Devon Rosado 
Problem List 
   
PMH: 
History of flu in March 2019 History of kidney stone Premature at birth History of Heart Murmur ? Hypothyroidism, states taht she is not on treatment because she told that treatment was optional to start but after she started she would have to stay on 
  
Social History  
  
       
Tobacco Use  Smoking status: Occasional tobacco and marijuana Substance Use Topics  Alcohol use: Social  
  
Family History: denies family history of CAD, CHF, Autoimmune Disease; Father with Alcoholism. 
  
Allergies: Chocolate makes the inside of patient's mouth peel 
  
  
  
   
  
   
Activity Status: 
   
OOB to Chair:  N Ambulated this shift N Bed Rest Y 
   
Supplemental O1: (AU Applicable) 
   
Room air    
   
LINES AND DRAINS: 
   
PIV 
  
  
DVT prophylaxis: 
   
DVT prophylaxis Med- N 
DVT prophylaxis SCD or DAYRON- Y 
   
Wounds: (If Applicable) 
   
Wounds- None   
Location     
Patient Safety: 
   
Falls Score Total Score:  1 
Safety Level_______ Bed Alarm On? N [address] for upcoming shift: Echo   
   
Discharge Plan: TBD 
  
  
   
Active Consults:  Endocrinology 
  
   
  
  
   
  
Revision History

## 2019-04-18 NOTE — DISCHARGE SUMMARY
Hospitalist Discharge Summary     Patient ID:  Olivia Robison  [id number]  24 y.o.  1990    PCP on record: Maria R Foote MD    Admit date: 4/16/2019  Discharge date and time: 4/18/2019      DISCHARGE DIAGNOSIS:  Hypotension with bradycardia   Thyroiditis  Left kidney stone with mild hydro  UTI rule out  Mild Hypokalemia  Incidental findings on CT A/P    CONSULTATIONS:  IP CONSULT TO ENDOCRINOLOGY  IP CONSULT TO CARDIOLOGY    Excerpted HPI from H&P of Chely Perry MD:  Olivia Robison is a 25 y.o.  female who presented with left flank pain this am that awoke her this am. Patient presented here with severe pain and felt that it was a kidney stone. Patient's CT A/P showed: \"1. Punctate stone left UVJ causing mild left hydronephrosis. Bilateral nonobstructing renal stones. 2. Concern for a hypodense lesion in the dome of the liver. Prominent gastrohepatic and retroperitoneal lymph nodes of uncertain etiology and clinical significance. Correlation with nonemergent contrast-enhanced CT is recommended. 3. Periportal and pericholecystic edema may be related to fluid administration. \" Patient denies any fevers or chills. She does get lightheaded with sitting.     Patient also hypotensive at time of my evaluation. Patient is unsure about her baseline BP. Patient reports having the flu last month and has not recoererd since. Patient reports that towards the end of her flu illness she had 3 days of severe chest heaviness. Patient reports continue REYNOSO even with house work since this time. Patient denies and LE swelling.      We were asked to admit for work up and evaluation of the above problems. ______________________________________________________________________  DISCHARGE SUMMARY/HOSPITAL COURSE:  for full details see H&P, daily progress notes, labs, consult notes.      Hypotension with bradycardia POA  In settings of TSH 36 with normal Total T4 and Equivalent Free T4  Suspect Thyroiditis as per endocrinology  S/p IV synthroid, holding further as per Endo recommendations  Endo will follow pt closely as an OP with plan to repeat labs in 2 weeks   Cardiology Consult appreciate, no intervention per cardiology  2D echo with normal EF  HR now improving, was in low 40s, now in upper 50s to low 60s and symptoms resolved (was having lightheadedness and chest heaviness when bradycardic but now symptoms revolved)  Hypotension has been resolved     Left kidney stone with mild hydro  Suspected UTI despite cultures been negative  It's not unusual to have negative UA with while having hydronephrosis, specially when suspicion is he current sickness cause thyroid disease  Will complete coarse of abx (given Levaquin, will discharge on Cipro)  Seems to have passed stone as Nausea, vomiting and pain resolved     Mild Hypokalemia  Resolved with repletion     Incidental findings on CT A/P: Concern for a hypodense lesion in the dome of the liver. Prominent gastrohepatic and retroperitoneal lymph nodes of uncertain etiology and clinical significance. Correlation with nonemergent contrast-enhanced CT is recommended. \"  LFT's wnl, US done but per radiology not to be the test for this study  discussed on admission with patient the Incidental Lymphadenopathy noted on CT   recommend  address with PCP  _______________________________________________________________________  Patient seen and examined by me on discharge day. Pertinent Findings:  Gen:    Not in distress  Chest: Clear lungs  CVS:   Regular rhythm. No edema  Abd:  Soft, not distended, not tender  Neuro:  Alert, non focal  _______________________________________________________________________    Current Discharge Medication List      START taking these medications    Details   ciprofloxacin HCl (CIPRO) 500 mg tablet Take 1 Tab by mouth two (2) times a day for 5 days.   Qty: 10 Tab, Refills: 0         CONTINUE these medications which have NOT CHANGED Details   cetirizine (ZYRTEC) 10 mg tablet Take 10 mg by mouth daily as needed for Allergies. My Recommended Diet, Activity, Wound Care, and follow-up labs are listed in the patient's Discharge Insturctions which I have personally completed and reviewed. ______________________________________________________________________    Risk of deterioration: Low    Condition at Discharge:  Stable  ______________________________________________________________________    Disposition  Home with family, no needs  ______________________________________________________________________    Care Plan discussed with:   Patient, Family, RN, Care Manager, Consultant    ______________________________________________________________________    Code Status: Full Code  ______________________________________________________________________      Follow up with:   PCP : Nani Robledo MD  Follow-up Information     Follow up With Specialties Details Why Contact Raza Yanez MD Urology Go on 4/30/2019 Please follow up on April 30, 2019 at 2:10 [address] [address].  [phone]      Nani Robledo MD Family Practice Go on 5/24/2019 at 1:20. Please arrive 15 minutes early to register with photo ID, insurance card and current list of medication. [address]  575 Alvarez Remy MD Endocrinology  Office will call to schedule hospital follow up appointment. if you don't here then please call and make an appointment to see him in 2 weeks 7 Jackeline Nunez NP Nurse Practitioner Go on 4/26/2019 at 1:30 [address] [facility]  [phone]      Thyroid Function Tests (TSH, Free T4 and Total T3)   Dr Prabhakar Alvarez will call you about labs.  If you don't hear then call the office     CT Abd/Pelvis with IV contrast   talk to your primary care about abnormal CT in abdomen               Total time in minutes spent coordinating this discharge (includes going over instructions, follow-up, prescriptions, and preparing report for sign off to her PCP) :  35 minutes    Signed:  Kit Ragsdale MD

## 2019-04-18 NOTE — TELEPHONE ENCOUNTER
----- Message from Norlin Cogan sent at 4/18/2019 12:08 PM EDT -----  Regarding: Dr. Ki Wall, [facility], requesting to schedule hospital f/up appt. Pt admitted Tuesday 04/16/2019 regarding kidney stones, vomiting and abnormal thyroids. Requesting for the office to call the pt to schedule f/up appt.    Best contact number [phone]

## 2019-04-18 NOTE — PROGRESS NOTES
Pharmacy Automatic Renal Dosing Protocol - Antimicrobials Indication for Antimicrobials:  UTI/Pyelonephritis Current Regimen of Each Antimicrobial:  
 
Levaquin 750 mg iv q24h (Start  - Day 3) Previous Antimicrobial Therapy: 
 
Significant Cultures:  
 
Urine culture  - NG- Final 
 
 
Radiology / Imaging results: (X-ray, CT scan or MRI):  
 
Paralysis, amputations, malnutrition:  
 
Labs: 
Recent Labs 19 
2421 19 
0988 CREA 0.66 1.02  
BUN 13 18 WBC 5.4 4.7 Temp (24hrs), Av.4 °F (36.9 °C), Min:98.1 °F (36.7 °C), Max:98.8 °F (37.1 °C) Creatinine Clearance (mL/min) or Dialysis: 93.7 Impression/Plan:  
Urine culture negative, no leukocytosis, afebrile, no signs of pyelonephritis reported on CT, recommend d/c levofloxacin. Pharmacy will follow daily and adjust medications as appropriate for renal function and/or serum levels. Thank you, Michelle Weiss, PHARMD 
 
Recommended duration of therapy 
http://[facility]/Pharmacy/Clinical%20Companion/Duration%20of%20ABX%20therapy. docx Renal Dosing 
http://[facility]/[place]/[facility]/Pharmacy/Clinical%20Companion/Renal%20Dosing%[id number]. pdf

## 2019-04-19 NOTE — TELEPHONE ENCOUNTER
Spoke to patient this morning. Told her to have her labs done in 2 weeks. Explained to her that she didn't need to have an order. Scheduled her on 7/26/19 at 3:10 PM for a hospital follow-up. Patient wanted to know if she needed to stay on Levothyroxine? If so, could you please call this into [facility]? Patient can be reached at:  [phone].        Coni  [phone]  Levothyroxine

## 2019-04-19 NOTE — PROGRESS NOTES
Spiritual Care Partner Volunteer visited patient in Neuro on April 18, 2019. Documented on April 19, 2019 by: 
ZACARIAS Muñiz, [facility],  [facility]  Paging Service  [phone])

## 2019-04-19 NOTE — TELEPHONE ENCOUNTER
She should not be on levothyroxine yet, I will start if appropriate based on her labs in 2 weeks, thanks Jas Vogt !

## 2019-07-26 ENCOUNTER — OFFICE VISIT (OUTPATIENT)
Dept: ENDOCRINOLOGY | Age: 29
End: 2019-07-26

## 2019-07-26 VITALS
DIASTOLIC BLOOD PRESSURE: 67 MMHG | HEIGHT: 62 IN | RESPIRATION RATE: 18 BRPM | SYSTOLIC BLOOD PRESSURE: 100 MMHG | BODY MASS INDEX: 19.45 KG/M2 | HEART RATE: 64 BPM | WEIGHT: 105.7 LBS | OXYGEN SATURATION: 97 %

## 2019-07-26 DIAGNOSIS — E06.9 THYROIDITIS: Primary | ICD-10-CM

## 2019-07-26 DIAGNOSIS — E03.9 ACQUIRED HYPOTHYROIDISM: ICD-10-CM

## 2019-07-26 NOTE — PROGRESS NOTES
Raj Forman is a 25 y.o. female      Chief Complaint   Patient presents with    New Patient     Pt is Hospital Follow-up    Thyroid Problem     . 1. Have you been to the ER, urgent care clinic since your last visit? Yes, ER [facility] for Flank Pain on 4/16/19  Hospitalized since your last visit? No    2. Have you seen or consulted any other health care providers outside of the [address] [place] [address] since your last visit? Include any pap smears or colon screening.  No

## 2019-07-26 NOTE — PATIENT INSTRUCTIONS
Labs today, will call you with results and plan,     Plan to return back to clinic on October 4th at 3:50 PM,     * Remember to have your blood work collected at the laboratory 3 days before your next visit using the lab sheet provided during your visit. Keke Campuzano.  [address] [place] [address]

## 2019-07-26 NOTE — PROGRESS NOTES
CHIEF COMPLAINT: f/u evaluation for hypothyroidism. HISTORY OF PRESENT ILLNESS:   Elva Issa is a 25 y.o. female with a PMHx as noted below who presents to the endocrinology clinic for f/u evaluation of hypothyroidism. Patient was seen back in April of this year for evaluation of hypotension during admission for a kidney stone. The primary team desired to exclude the possibility of adrenal insufficiency. Her cortisol however was normal and an ACTH stimulation test that I obtained was also normal suggestive of normal adrenal function effectively excluding the possibility of active adrenal insufficiency as a cause. While reviewing her labs at the time she had some thyroid levels that were not synchronized, with both elevated TSH and FT4. She did admit to having had a broken tooth which may have become infected and was not addressed, and this was suspected to have possibly led to a thyroiditis. She had also admitted to having had a prior history of hypothyroidism as far back as her first pregnancy in 2010 which resolved thereafter, but recurred again and she had not taken her methimazole regularly and so it was no longer prescribed at the time by her doctor (per her). For more details, see initial inpatient consultation report from April 2019. Patient presents today for f/u though we had anticipated a sooner follow up visit. She did not complete her thyroid labs as we had planned before the visit thus her current status is unknown. She does report that she gets palpitations here and there. She does have some anxiety as well. She has an appt next week to have her tooth looked at. She is very thin and is having trouble keeping her weight up. She is currently underweight at 105 pounds, BMI <19.      Review of most recent thyroid function:  Lab Results   Component Value Date    TSH 36.60 (H) 04/16/2019    FT4 1.6 (H) 04/16/2019    TMCLT 18 04/16/2019    TGAB 2.0 (H) 04/16/2019      Thyroid Lab Key:  TSILT = Thyroid stimulating antibodies  TRALT = TSH Receptor Antibodies  TMCLT = TPO antibodies  T3LT = Total T3 levels  460122 = Direct FT4  207667 = Free T3    ACTH Stimulation Test  Component      Latest Ref Rng & Units 4/17/2019           4:00 PM   Cortisol, baseline      ug/dL 10.8   Cortisol, 30 min.      ug/dL 26.2   Cortisol, 60 min.      ug/dL 31.5       PAST MEDICAL/SURGICAL HISTORY:   Past Medical History:   Diagnosis Date    Bradycardia 4/17/2019    Flank pain 4/17/2019     History reviewed. No pertinent surgical history. ALLERGIES:   Allergies   Allergen Reactions    Chocolate [Cocoa] Other (comments)     Mouth Burns/peel    Spinach Itching       MEDICATIONS ON ADMISSION:     Current Outpatient Medications:     etonogestrel ([place]), by SubDERmal route., Disp: , Rfl:     cetirizine (ZYRTEC) 10 mg tablet, Take 10 mg by mouth daily as needed for Allergies. , Disp: , Rfl:     SOCIAL HISTORY:   Social History     Socioeconomic History    Marital status: SINGLE     Spouse name: Not on file    Number of children: Not on file    Years of education: Not on file    Highest education level: Not on file   Occupational History    Not on file   Social Needs    Financial resource strain: Not on file    Food insecurity:     Worry: Not on file     Inability: Not on file    Transportation needs:     Medical: Not on file     Non-medical: Not on file   Tobacco Use    Smoking status: Current Every Day Smoker    Smokeless tobacco: Never Used   Substance and Sexual Activity    Alcohol use: Not Currently    Drug use: Never    Sexual activity: Not on file   Lifestyle    Physical activity:     Days per week: Not on file     Minutes per session: Not on file    Stress: Not on file   Relationships    Social connections:     Talks on phone: Not on file     Gets together: Not on file     Attends Sikh service: Not on file     Active member of club or organization: Not on file     Attends meetings of clubs or organizations: Not on file     Relationship status: Not on file    Intimate partner violence:     Fear of current or ex partner: Not on file     Emotionally abused: Not on file     Physically abused: Not on file     Forced sexual activity: Not on file   Other Topics Concern    Not on file   Social History Narrative    Not on file       FAMILY HISTORY:  Family History   Problem Relation Age of Onset    No Known Problems Mother     No Known Problems Father        REVIEW OF SYSTEMS: Complete ROS assessed and noted for that which is described above, all else are negative. Eyes: normal  ENT: normal  CVS: normal  Resp: normal  GI: normal  : normal  GYN: normal  Endocrine: normal  Integument: normal  Musculoskeletal: normal  Neuro: normal  Psych: normal      PHYSICAL EXAMINATION:    VITAL SIGNS:  Visit Vitals  /67 (BP 1 Location: Left arm, BP Patient Position: Sitting)   Pulse 64   Resp 18   Ht 5' 2\" (1.575 m)   Wt 105 lb 11.2 oz (47.9 kg)   LMP 06/26/2019 Comment: Birth Control   SpO2 97%   BMI 19.33 kg/m²       GENERAL: NCAT, Sitting comfortably, NAD  EYES: EOMI, non-icteric, no proptosis  Ear/Nose/Throat: NCAT, no inflammation, no masses  LYMPH NODES: No LAD  CARDIOVASCULAR: S1 S2, RRR, No murmur, 2+ radial pulses  RESPIRATORY: CTA b/l, no wheeze/rales  GASTROINTESTINAL:  ND  MUSCULOSKELETAL: Normal ROM, no atrophy  SKIN: warm, no edema/rash/ or other skin changes  NEUROLOGIC: 5/5 power all extremities, no tremors, AAOx3  PSYCHIATRIC: Normal affect, Normal insight and judgement      REVIEW OF LABORATORY AND RADIOLOGY DATA:   Labs and documentation have been reviewed as described above. ASSESSMENT AND PLAN:   Indu Mayorga is a 25 y.o. female with a PMHx as noted above who presents to the endocrinology clinic for the f/u evaluation of hypothyroidism.      Hypothyroidism  Abnormal thyroid levels    Although patients TSH was elevated previously, it is notable that her symptoms of palpitations, low weight weight difficulty gaining weight, are more suspicious of hyperthyroidism. In the setting of having been on levothyroxine historically, it is not totally clear. Thyroiditis can certainly explain it but it would be unusual for her to have symptoms continuously. It is noted however that she has still not addressed her tooth issue and a recurring thyroiditis is always still possible. Though her TSH and T4 were both elevated during the hospitalization, a primary TSH secreting pituitary adenoma is unusual and unlikely, however I will review her labs (collected today) and consider all possibilities. She is aware that we may need to start a treatment after reviewing her results. Labs today, will discuss results and plan by phone,  RTC in 2-3 months with prelabs 3 days prior, discussed,  25 minutes spent together with patient today of which >50% of this time was spent in counseling and coordination of care. Alexa Del Valle.  [address] [facility]

## 2019-07-27 LAB
T3 SERPL-MCNC: 148 NG/DL (ref 71–180)
T4 FREE SERPL-MCNC: 1.14 NG/DL (ref 0.82–1.77)
TSH RECEP AB SER-ACNC: <1.1 IU/L (ref 0–1.75)
TSH SERPL DL<=0.005 MIU/L-ACNC: 48.89 UIU/ML (ref 0.45–4.5)
TSI SER-ACNC: <0.1 IU/L (ref 0–0.55)

## 2019-07-29 RX ORDER — LEVOTHYROXINE SODIUM 50 UG/1
50 TABLET ORAL
Qty: 90 TAB | Refills: 3 | Status: SHIPPED | OUTPATIENT
Start: 2019-07-29

## 2019-07-29 NOTE — PROGRESS NOTES
Thyroid antibodies are negative,  TSH is 48.89,  Thyroid hormone levels are normal, though not particularly low, with her TT3 as up to 148. It seems unusual for the TSH to be this high with her levels or thyroid hormone,   I would recommend a trial of 50 mcg once daily levothyroxine,  I called the patient to notify her, prelabs before next visit already ordered,    Mesha Cortés.  [address] [facility]  [address] [place] [address]

## 2019-08-06 ENCOUNTER — OFFICE VISIT (OUTPATIENT)
Dept: FAMILY MEDICINE CLINIC | Age: 29
End: 2019-08-06

## 2019-08-06 VITALS
SYSTOLIC BLOOD PRESSURE: 108 MMHG | HEART RATE: 61 BPM | OXYGEN SATURATION: 98 % | WEIGHT: 100.8 LBS | RESPIRATION RATE: 16 BRPM | BODY MASS INDEX: 18.55 KG/M2 | DIASTOLIC BLOOD PRESSURE: 73 MMHG | HEIGHT: 62 IN | TEMPERATURE: 98.6 F

## 2019-08-06 DIAGNOSIS — E87.6 HYPOKALEMIA: ICD-10-CM

## 2019-08-06 DIAGNOSIS — R59.9 ENLARGED LYMPH NODES: Primary | ICD-10-CM

## 2019-08-06 DIAGNOSIS — F41.9 ANXIETY: ICD-10-CM

## 2019-08-06 DIAGNOSIS — R16.0 HYPODENSE MASS OF LIVER: ICD-10-CM

## 2019-08-06 DIAGNOSIS — R93.5 ABNORMAL CT OF THE ABDOMEN: ICD-10-CM

## 2019-08-06 RX ORDER — BUSPIRONE HYDROCHLORIDE 5 MG/1
5 TABLET ORAL 2 TIMES DAILY
Qty: 60 TAB | Refills: 2 | Status: SHIPPED | OUTPATIENT
Start: 2019-08-06

## 2019-08-06 NOTE — PROGRESS NOTES
Chief Complaint   Patient presents with   [facility] Follow Up     Pt was in the hospital in April. Pt was not able to follow up with her previous provider. Pt was recently seen by endocrinology due to abnormal TSH. Pt has had increased anxiety for the past several years, increased in social situations. Pt reports palpitations. Pt reports that xanax just made her sleep, Zoloft made her manic. Subjective: (As above and below)     Chief Complaint   Patient presents with   [facility] Follow Up     she is a 30y.o. year old female who presents for evaluation. Reviewed PmHx, RxHx, FmHx, SocHx, AllgHx and updated in chart. Review of Systems - negative except as listed above    Objective:     Vitals:    08/06/19 1327   BP: 108/73   Pulse: 61   Resp: 16   Temp: 98.6 °F (37 °C)   TempSrc: Oral   SpO2: 98%   Weight: 100 lb 12.8 oz (45.7 kg)   Height: 5' 2\" (1.575 m)     Physical Examination: General appearance - alert, well appearing, and in no distress  Mental status - normal mood, behavior, speech, dress, motor activity, and thought processes  Mouth - mucous membranes moist, pharynx normal without lesions  Chest - clear to auscultation, no wheezes, rales or rhonchi, symmetric air entry  Heart - normal rate, regular rhythm, normal S1, S2, no murmurs, rubs, clicks or gallops  Musculoskeletal - no joint tenderness, deformity or swelling  Extremities - peripheral pulses normal, no pedal edema, no clubbing or cyanosis    Assessment/ Plan:   1. Enlarged lymph nodes  - follow up on abnormal CT  - CT ABD PELV W CONT; Future    2. Hypodense mass of liver  -get follow up imaging  - CT ABD PELV W CONT; Future    3. Abnormal CT of the abdomen  - CT ABD PELV W CONT; Future    4. Anxiety  -trial of buspar to help with anxiety  - busPIRone (BUSPAR) 5 mg tablet; Take 1 Tab by mouth two (2) times a day. Dispense: 60 Tab; Refill: 2    5.  Hypokalemia  -recheck levels today  - METABOLIC PANEL, COMPREHENSIVE     Follow up as needed    I have discussed the diagnosis with the patient and the intended plan as seen in the above orders. The patient has received an after-visit summary and questions were answered concerning future plans.      Medication Side Effects and Warnings were discussed with patient: yes  Patient Labs were reviewed: yes  Patient Past Records were reviewed:  yes    Dani Hernandez M.D.

## 2019-08-06 NOTE — PATIENT INSTRUCTIONS
Hypokalemia: Care Instructions Your Care Instructions Hypokalemia (say \"zr-pu-ynm-BREE-yemi-uh\") is a low level of potassium. The heart, muscles, kidneys, and nervous system all need potassium to work well. This problem has many different causes. Kidney problems, diet, and medicines like diuretics and laxatives can cause it. So can vomiting or diarrhea. In some cases, cancer is the cause. Your doctor may do tests to find the cause of your low potassium levels. You may need medicines to bring your potassium levels back to normal. You may also need regular blood tests to check your potassium. If you have very low potassium, you may need intravenous (IV) medicines. You also may need tests to check the electrical activity of your heart. Heart problems caused by low potassium levels can be very serious. Follow-up care is a key part of your treatment and safety. Be sure to make and go to all appointments, and call your doctor if you are having problems. It's also a good idea to know your test results and keep a list of the medicines you take. How can you care for yourself at home? · If your doctor recommends it, eat foods that have a lot of potassium. These include fresh fruits, juices, and vegetables. They also include nuts, beans, and milk. · Be safe with medicines. If your doctor prescribes medicines or potassium supplements, take them exactly as directed. Call your doctor if you have any problems with your medicines. · Get your potassium levels tested as often as your doctor tells you. When should you call for help? Call 911 anytime you think you may need emergency care. For example, call if: 
  · You feel like your heart is missing beats. Heart problems caused by low potassium can cause death.  
  · You passed out (lost consciousness).  
  · You have a seizure.  
 Call your doctor now or seek immediate medical care if: 
  · You feel weak or unusually tired.  
  · You have severe arm or leg cramps.   · You have tingling or numbness.  
  · You feel sick to your stomach, or you vomit.  
  · You have belly cramps.  
  · You feel bloated or constipated.  
  · You have to urinate a lot.  
  · You feel very thirsty most of the time.  
  · You are dizzy or lightheaded, or you feel like you may faint.  
  · You feel depressed, or you lose touch with reality.  
 Watch closely for changes in your health, and be sure to contact your doctor if: 
  · You do not get better as expected. Where can you learn more? Go to http://bjorn-marlys.info/. Enter G358 in the search box to learn more about \"Hypokalemia: Care Instructions. \" Current as of: November 6, 2018 Content Version: 12.1 © 3244-7160 [employer]. Care instructions adapted under license by [employer] (which disclaims liability or warranty for this information). If you have questions about a medical condition or this instruction, always ask your healthcare professional. [address] any warranty or liability for your use of this information.

## 2019-08-06 NOTE — PROGRESS NOTES
.  Chief Complaint   Patient presents with   [facility] Follow Up     .1. Have you been to the ER, urgent care clinic since your last visit? Hospitalized since your last visit? yes    2. Have you seen or consulted any other health care providers outside of the [address] [place] [address] since your last visit? Include any pap smears or colon screening. No    .  3 most recent PHQ Screens 8/6/2019   Little interest or pleasure in doing things Nearly every day   Feeling down, depressed, irritable, or hopeless Several days   Total Score PHQ 2 4   Trouble falling or staying asleep, or sleeping too much Nearly every day   Feeling tired or having little energy Nearly every day   Poor appetite, weight loss, or overeating Several days   Feeling bad about yourself - or that you are a failure or have let yourself or your family down Not at all   Trouble concentrating on things such as school, work, reading, or watching TV Several days   Moving or speaking so slowly that other people could have noticed; or the opposite being so fidgety that others notice Not at all   Thoughts of being better off dead, or hurting yourself in some way Not at all   PHQ 9 Score 12   How difficult have these problems made it for you to do your work, take care of your home and get along with others Very difficult     . Health Maintenance Due   Topic Date Due    Pneumococcal 0-64 years (1 of 1 - PPSV23) 07/31/1994    DTaP/Tdap/Td series (1 - Tdap) 07/31/2009    PAP AKA CERVICAL CYTOLOGY  07/31/2009    Influenza Age 9 to Adult  08/01/2019     . Sara Manzanares

## 2019-08-07 LAB
ALBUMIN SERPL-MCNC: 4.6 G/DL (ref 3.5–5.5)
ALBUMIN/GLOB SERPL: 1.8 {RATIO} (ref 1.2–2.2)
ALP SERPL-CCNC: 80 IU/L (ref 39–117)
ALT SERPL-CCNC: 26 IU/L (ref 0–32)
AST SERPL-CCNC: 30 IU/L (ref 0–40)
BILIRUB SERPL-MCNC: 0.6 MG/DL (ref 0–1.2)
BUN SERPL-MCNC: 13 MG/DL (ref 6–20)
BUN/CREAT SERPL: 16 (ref 9–23)
CALCIUM SERPL-MCNC: 9.4 MG/DL (ref 8.7–10.2)
CHLORIDE SERPL-SCNC: 101 MMOL/L (ref 96–106)
CO2 SERPL-SCNC: 23 MMOL/L (ref 20–29)
CREAT SERPL-MCNC: 0.79 MG/DL (ref 0.57–1)
GLOBULIN SER CALC-MCNC: 2.6 G/DL (ref 1.5–4.5)
GLUCOSE SERPL-MCNC: 84 MG/DL (ref 65–99)
POTASSIUM SERPL-SCNC: 4.5 MMOL/L (ref 3.5–5.2)
PROT SERPL-MCNC: 7.2 G/DL (ref 6–8.5)
SODIUM SERPL-SCNC: 138 MMOL/L (ref 134–144)

## 2019-08-15 ENCOUNTER — HOSPITAL ENCOUNTER (OUTPATIENT)
Dept: CT IMAGING | Age: 29
Discharge: HOME | End: 2019-08-15
Attending: FAMILY MEDICINE
Payer: MEDICAID

## 2019-08-15 DIAGNOSIS — R59.9 ENLARGED LYMPH NODES: ICD-10-CM

## 2019-08-15 DIAGNOSIS — R93.5 ABNORMAL CT OF THE ABDOMEN: ICD-10-CM

## 2019-08-15 DIAGNOSIS — R16.0 HYPODENSE MASS OF LIVER: ICD-10-CM

## 2019-08-15 PROCEDURE — 74178 CT ABD&PLV WO CNTR FLWD CNTR: CPT

## 2019-08-15 PROCEDURE — 74011000258 HC RX REV CODE- 258: Performed by: FAMILY MEDICINE

## 2019-08-15 PROCEDURE — 74011636320 HC RX REV CODE- 636/320: Performed by: FAMILY MEDICINE

## 2019-08-15 RX ORDER — SODIUM CHLORIDE 0.9 % (FLUSH) 0.9 %
10 SYRINGE (ML) INJECTION
Status: COMPLETED | OUTPATIENT
Start: 2019-08-15 | End: 2019-08-15

## 2019-08-15 RX ADMIN — Medication 10 ML: at 12:39

## 2019-08-15 RX ADMIN — SODIUM CHLORIDE 100 ML: 900 INJECTION, SOLUTION INTRAVENOUS at 12:39

## 2019-08-15 RX ADMIN — IOPAMIDOL 100 ML: 755 INJECTION, SOLUTION INTRAVENOUS at 12:39

## 2019-08-20 NOTE — PROGRESS NOTES
Ms. Jailyn Bryn notified Dr. Xiomara Baltazar said Normal CT.  Area of liver that needed further examination is now normal.  She voiced understanding

## 2020-06-22 NOTE — ROUTINE PROCESS
Bedside, Verbal and Written shift change report given to Rai Davis RN (oncoming nurse) by Missael Galeano RN (offgoing nurse). Report included the following information SBAR, Kardex, MAR and Recent Results. yes...